# Patient Record
Sex: FEMALE | Race: WHITE | NOT HISPANIC OR LATINO | Employment: FULL TIME | URBAN - METROPOLITAN AREA
[De-identification: names, ages, dates, MRNs, and addresses within clinical notes are randomized per-mention and may not be internally consistent; named-entity substitution may affect disease eponyms.]

---

## 2019-02-21 ENCOUNTER — ANCILLARY PROCEDURE (OUTPATIENT)
Dept: GENERAL RADIOLOGY | Facility: CLINIC | Age: 24
End: 2019-02-21
Attending: INTERNAL MEDICINE
Payer: OTHER GOVERNMENT

## 2019-02-21 ENCOUNTER — OFFICE VISIT (OUTPATIENT)
Dept: URGENT CARE | Facility: URGENT CARE | Age: 24
End: 2019-02-21
Payer: OTHER GOVERNMENT

## 2019-02-21 VITALS
DIASTOLIC BLOOD PRESSURE: 80 MMHG | WEIGHT: 150 LBS | BODY MASS INDEX: 24.96 KG/M2 | SYSTOLIC BLOOD PRESSURE: 128 MMHG | TEMPERATURE: 98.6 F | RESPIRATION RATE: 12 BRPM | OXYGEN SATURATION: 99 % | HEART RATE: 104 BPM

## 2019-02-21 DIAGNOSIS — M54.6 ACUTE RIGHT-SIDED THORACIC BACK PAIN: Primary | ICD-10-CM

## 2019-02-21 DIAGNOSIS — R06.02 SHORTNESS OF BREATH: ICD-10-CM

## 2019-02-21 LAB
ALBUMIN UR-MCNC: NEGATIVE MG/DL
APPEARANCE UR: CLEAR
BILIRUB UR QL STRIP: NEGATIVE
COLOR UR AUTO: YELLOW
GLUCOSE UR STRIP-MCNC: NEGATIVE MG/DL
HGB UR QL STRIP: NEGATIVE
KETONES UR STRIP-MCNC: NEGATIVE MG/DL
LEUKOCYTE ESTERASE UR QL STRIP: NEGATIVE
NITRATE UR QL: NEGATIVE
PH UR STRIP: 5.5 PH (ref 5–7)
SOURCE: NORMAL
SP GR UR STRIP: >1.03 (ref 1–1.03)
UROBILINOGEN UR STRIP-ACNC: 0.2 EU/DL (ref 0.2–1)

## 2019-02-21 PROCEDURE — 99214 OFFICE O/P EST MOD 30 MIN: CPT | Performed by: INTERNAL MEDICINE

## 2019-02-21 PROCEDURE — 71046 X-RAY EXAM CHEST 2 VIEWS: CPT

## 2019-02-21 PROCEDURE — 81003 URINALYSIS AUTO W/O SCOPE: CPT | Performed by: INTERNAL MEDICINE

## 2019-02-21 NOTE — PROGRESS NOTES
SUBJECTIVE:   Omar Holt is a 23 year old female who presents to clinic today for the following health issues:      Patient is a 23-year-old previously healthy female who presents for evaluation of right-sided thoracic pain.  Notes starts in the back wraps around to the front around her lower rib cage area.  She has been studying for respiratory therapy boards and notes that this may have contributed.  She is on Ortho Tri-Cyclen but no history of DVT or PE in the family.  She notes that when she takes a deep breath and turns her back this will cause her breathing to feel slightly short.  She has some associated slight dizziness feeling sweaty at times when pain is worse she notes that she has to lay down at times due to this.  She has been taking ibuprofen 1 pill twice per day for help with pain.  Has been pushing fluids and urinating more frequently no dysuria no hematuria.  Slightly decreased appetite.  No fevers did have a cough and URI prior to start of symptoms this is since resolved.    Has been taking ibuprofen, not helping.     Has been taking fluids.     Has been urinating more frequently    Decreased appetite noted. Has been pushing fluids.     No fevers. Feeling sweaty- feeling warm at times.             Problem list and histories reviewed & adjusted, as indicated.  Additional history: as documented    There is no problem list on file for this patient.    No past surgical history on file.    Social History     Tobacco Use     Smoking status: Never Smoker     Smokeless tobacco: Never Used   Substance Use Topics     Alcohol use: No     No family history on file.        Reviewed and updated as needed this visit by clinical staff  Tobacco  Allergies  Meds       Reviewed and updated as needed this visit by Provider         ROS:  Constitutional, HEENT, cardiovascular, pulmonary, GI, , musculoskeletal, neuro, skin, endocrine and psych systems are negative, except as otherwise noted.    OBJECTIVE:      /80   Pulse 104   Temp 98.6  F (37  C) (Oral)   Resp 12   Wt 68 kg (150 lb)   SpO2 99%   BMI 24.96 kg/m    Body mass index is 24.96 kg/m .  GENERAL: healthy, alert and no distress  EYES: Eyes grossly normal to inspection, PERRL and conjunctivae and sclerae normal  HENT: ear canals and TM's normal, nose and mouth without ulcers or lesions  NECK: no adenopathy, no asymmetry, masses, or scars and thyroid normal to palpation  RESP: lungs clear to auscultation - no rales, rhonchi or wheezes  CV: regular rate and rhythm, normal S1 S2, no S3 or S4, no murmur, click or rub, no peripheral edema and peripheral pulses strong  ABDOMEN: soft, nontender, no hepatosplenomegaly, no masses and bowel sounds normal  MS: reproduction of patient's symptoms with palpation along the right paraspinal muscles in the lower thoracic area/upper lumbar area, no masses, no central spine tenderness  SKIN: no suspicious lesions or rashes  NEURO: Normal strength and tone, mentation intact and speech normal  PSYCH: mentation appears normal, affect normal/bright    Diagnostic Test Results:  Results for orders placed or performed in visit on 02/21/19   *UA reflex to Microscopic and Culture ([place] and [facility] (except [place] and [place])   Result Value Ref Range    Color Urine Yellow     Appearance Urine Clear     Glucose Urine Negative NEG^Negative mg/dL    Bilirubin Urine Negative NEG^Negative    Ketones Urine Negative NEG^Negative mg/dL    Specific Gravity Urine >1.030 1.003 - 1.035    Blood Urine Negative NEG^Negative    pH Urine 5.5 5.0 - 7.0 pH    Protein Albumin Urine Negative NEG^Negative mg/dL    Urobilinogen Urine 0.2 0.2 - 1.0 EU/dL    Nitrite Urine Negative NEG^Negative    Leukocyte Esterase Urine Negative NEG^Negative    Source Midstream Urine      Xray without signs of PTX   ASSESSMENT/PLAN:       ICD-10-CM    1. Acute right-sided thoracic back pain M54.6 *UA reflex to Microscopic and Culture ([place] and  [facility] (except [place] and [place])   2. Shortness of breath R06.02 XR Chest 2 Views     Do not think this is a pulmonary embolism given nature of patient's symptoms reproduced on palpation.  Discussed warning signs for PE and need for evaluation in the ER if worsening symptoms.  Discussed symptomatic treatments.  No signs of UTI or nephrolithiasis.  No signs of pneumothorax or pneumonia on x-ray today.  See patient instructions.    A total of 25 minutes was spent evaluating patient with greater than 50% of time in direct patient contact and counseling of patient of above problems.      Reynaldo Fletcher MD, MD [facility]

## 2019-02-21 NOTE — NURSING NOTE
"Chief Complaint   Patient presents with     Urgent Care     Pain     Sharp pain on right side under ribs and radiating into back area.  She says it hurts to cough/sneeze.  No injury that she knows of.  No fever.  She has felt warm at times though.       Initial /80   Pulse 104   Temp 98.6  F (37  C) (Oral)   Resp 12   Wt 68 kg (150 lb)   SpO2 99%   BMI 24.96 kg/m   Estimated body mass index is 24.96 kg/m  as calculated from the following:    Height as of 8/2/15: 1.651 m (5' 5\").    Weight as of this encounter: 68 kg (150 lb)..  BP completed using cuff size: rashad Fitzgerald R.N.    "

## 2019-02-22 NOTE — PATIENT INSTRUCTIONS
1) Ibuprofen 2-3 times per day as needed    2) Try heat to help with pain and relax area 10-15 minutes per time to help    3) Try stretching back area as we discussed.    If dizziness worsening, or if breathing worse, would go to ER for further evaluation. Likely related to spasm pain going on right now.    Reynaldo Fletcher MD            Patient Education     Back Spasm (No Trauma)    Spasm of the back muscles can occur after a sudden forceful twisting or bending force (such as in a car accident), after a simple awkward movement, or after lifting something heavy with poor body positioning. In any case, muscle spasm adds to the pain. Sleeping in an awkward position or on a poor quality mattress can also cause this. Some people respond to emotional stress by tensing the muscles of their back.  Pain that continues may need further evaluation or other types of treatment such as physical therapy.  You don't always need X-rays for the initial evaluation of back pain, unless you had a physical injury such as from a car accident or fall. If your pain continues and doesn't respond to medical treatment, X-rays and other tests may then be done.   Home care    As soon as possible, start sitting or walking again to avoid problems from prolonged bed rest (muscle weakness, worsening back stiffness and pain, blood clots in the legs).    When in bed, try to find a position of comfort. A firm mattress is best. Try lying flat on your back with pillows under your knees. You can also try lying on your side with your knees bent up toward your chest and a pillow between your knees.    Avoid prolonged sitting, long car rides, or travel. This puts more stress on the lower back than standing or walking.     During the first 24 to 72 hours after an injury or flare-up, apply an ice pack to the painful area for 20 minutes, then remove it for 20 minutes. Do this over a period of 60 to 90 minutes or several times a day. This will reduce swelling and  pain. Always wrap ice packs in a thin towel.    You can start with ice, then switch to heat. Heat (hot shower, hot bath, or heating pad) reduces pain, and works well for muscle spasms. Apply heat to the painful area for 20 minutes, then remove it for 20 minutes. Do this over a period of 60 to 90 minutes or several times a day. Do not sleep on a heating pad as it can burn or damage skin.    Alternate ice and heat therapies.    Be aware of safe lifting methods and do not lift anything over 15 pounds until all the pain is gone.  Gentle stretching will help your back heal faster. Do this simple routine 2 to 3 times a day until your back is feeling better.    Lie on your back with your knees bent and both feet on the ground    Slowly raise your left knee to your chest as you flatten your lower back against the floor. Hold for 20 to 30 seconds.    Relax and repeat the exercise with your right knee.    Do 2 to 3 of these exercises for each leg.    Repeat, hugging both knees to your chest at the same time.    Do not bounce, but use a gentle pull.  Medicines  Talk to your doctor before using medicine, especially if you have other medical problems or are taking other medicines.  You may use acetaminophen or ibuprofen to control pain, unless your healthcare provider prescribed another pain medicine. If you have a chronic condition such as diabetes, liver or kidney disease, stomach ulcer, or gastrointestinal bleeding, or are taking blood thinners, talk with your healthcare provider before taking any medicines.  Be careful if you are given prescription pain medicine, narcotics, or medicine for muscle spasm. They can cause drowsiness, affect your coordination, reflexes, or judgment. Do not drive or operate heavy machinery when taking these medicines. Take pain medicine only as prescribed by your healthcare provider.  Follow-up care  Follow up with your doctor, or as advised. Physical therapy or further tests may be needed.  If  X-rays were taken, they may be reviewed by a radiologist. You will be notified of any new findings that may affect your care.  Call 911  Call 911 if any of these occur:    Trouble breathing    Confusion    Drowsiness or trouble awakening    Fainting or loss of consciousness    Rapid or very slow heart rate    Loss of bowel or bladder control  When to seek medical advice  Call your healthcare provider right away if any of these occur:    Pain becomes worse or spreads to your legs    Weakness or numbness in one or both legs    Numbness in the groin or genital area    Fever of 100.4 F (38 C) or higher, or as directed by your healthcare provider    Burning or pain when passing urine  Date Last Reviewed: 6/1/2016 2000-2018 [employer]. [address] [place] [address], [place] [address]. All rights reserved. This information is not intended as a substitute for professional medical care. Always follow your healthcare professional's instructions.

## 2019-04-19 ENCOUNTER — HEALTH MAINTENANCE LETTER (OUTPATIENT)
Age: 24
End: 2019-04-19

## 2019-11-05 ENCOUNTER — HEALTH MAINTENANCE LETTER (OUTPATIENT)
Age: 24
End: 2019-11-05

## 2020-11-22 ENCOUNTER — HEALTH MAINTENANCE LETTER (OUTPATIENT)
Age: 25
End: 2020-11-22

## 2021-09-19 ENCOUNTER — HEALTH MAINTENANCE LETTER (OUTPATIENT)
Age: 26
End: 2021-09-19

## 2022-01-01 ENCOUNTER — TRANSFERRED RECORDS (OUTPATIENT)
Dept: HEALTH INFORMATION MANAGEMENT | Facility: CLINIC | Age: 27
End: 2022-01-01
Payer: OTHER GOVERNMENT

## 2022-01-01 LAB — PAP SMEAR - HIM PATIENT REPORTED: NEGATIVE

## 2022-01-04 ENCOUNTER — HOSPITAL ENCOUNTER (EMERGENCY)
Facility: CLINIC | Age: 27
Discharge: HOME | End: 2022-01-05
Attending: EMERGENCY MEDICINE | Admitting: EMERGENCY MEDICINE
Payer: COMMERCIAL

## 2022-01-04 ENCOUNTER — HOSPITAL ENCOUNTER (EMERGENCY)
Facility: CLINIC | Age: 27
Discharge: STILL ADMITTED OR ADMINISTRATIVE | End: 2022-01-04
Attending: EMERGENCY MEDICINE | Admitting: EMERGENCY MEDICINE
Payer: COMMERCIAL

## 2022-01-04 ENCOUNTER — APPOINTMENT (OUTPATIENT)
Dept: CT IMAGING | Facility: CLINIC | Age: 27
End: 2022-01-04
Attending: EMERGENCY MEDICINE
Payer: COMMERCIAL

## 2022-01-04 VITALS
HEART RATE: 103 BPM | HEIGHT: 65 IN | TEMPERATURE: 99 F | DIASTOLIC BLOOD PRESSURE: 84 MMHG | OXYGEN SATURATION: 99 % | BODY MASS INDEX: 24.99 KG/M2 | WEIGHT: 150 LBS | RESPIRATION RATE: 18 BRPM | SYSTOLIC BLOOD PRESSURE: 145 MMHG

## 2022-01-04 DIAGNOSIS — N83.201 HEMORRHAGIC CYST OF RIGHT OVARY: ICD-10-CM

## 2022-01-04 LAB
ALBUMIN SERPL-MCNC: 4.6 G/DL (ref 3.4–5)
ALBUMIN UR-MCNC: NEGATIVE MG/DL
ALP SERPL-CCNC: 69 U/L (ref 40–150)
ALT SERPL W P-5'-P-CCNC: 29 U/L (ref 0–50)
ANION GAP SERPL CALCULATED.3IONS-SCNC: 6 MMOL/L (ref 3–14)
APPEARANCE UR: CLEAR
AST SERPL W P-5'-P-CCNC: 17 U/L (ref 0–45)
BASOPHILS # BLD AUTO: 0.1 10E3/UL (ref 0–0.2)
BASOPHILS NFR BLD AUTO: 1 %
BILIRUB SERPL-MCNC: 0.2 MG/DL (ref 0.2–1.3)
BILIRUB UR QL STRIP: NEGATIVE
BUN SERPL-MCNC: 8 MG/DL (ref 7–30)
CALCIUM SERPL-MCNC: 9.8 MG/DL (ref 8.5–10.1)
CHLORIDE BLD-SCNC: 106 MMOL/L (ref 94–109)
CO2 SERPL-SCNC: 26 MMOL/L (ref 20–32)
COLOR UR AUTO: ABNORMAL
CREAT SERPL-MCNC: 0.67 MG/DL (ref 0.52–1.04)
EOSINOPHIL # BLD AUTO: 0 10E3/UL (ref 0–0.7)
EOSINOPHIL NFR BLD AUTO: 1 %
ERYTHROCYTE [DISTWIDTH] IN BLOOD BY AUTOMATED COUNT: 12.7 % (ref 10–15)
GFR SERPL CREATININE-BSD FRML MDRD: >90 ML/MIN/1.73M2
GLUCOSE BLD-MCNC: 83 MG/DL (ref 70–99)
GLUCOSE UR STRIP-MCNC: NEGATIVE MG/DL
HCG SERPL QL: NEGATIVE
HCT VFR BLD AUTO: 42.5 % (ref 35–47)
HGB BLD-MCNC: 14 G/DL (ref 11.7–15.7)
HGB UR QL STRIP: NEGATIVE
HOLD SPECIMEN: NORMAL
IMM GRANULOCYTES # BLD: 0 10E3/UL
IMM GRANULOCYTES NFR BLD: 0 %
KETONES UR STRIP-MCNC: ABNORMAL MG/DL
LEUKOCYTE ESTERASE UR QL STRIP: NEGATIVE
LIPASE SERPL-CCNC: 110 U/L (ref 73–393)
LYMPHOCYTES # BLD AUTO: 2.4 10E3/UL (ref 0.8–5.3)
LYMPHOCYTES NFR BLD AUTO: 27 %
MCH RBC QN AUTO: 30.1 PG (ref 26.5–33)
MCHC RBC AUTO-ENTMCNC: 32.9 G/DL (ref 31.5–36.5)
MCV RBC AUTO: 91 FL (ref 78–100)
MONOCYTES # BLD AUTO: 0.5 10E3/UL (ref 0–1.3)
MONOCYTES NFR BLD AUTO: 6 %
NEUTROPHILS # BLD AUTO: 5.7 10E3/UL (ref 1.6–8.3)
NEUTROPHILS NFR BLD AUTO: 65 %
NITRATE UR QL: NEGATIVE
NRBC # BLD AUTO: 0 10E3/UL
NRBC BLD AUTO-RTO: 0 /100
PH UR STRIP: 7 [PH] (ref 5–7)
PLATELET # BLD AUTO: 244 10E3/UL (ref 150–450)
POTASSIUM BLD-SCNC: 3.6 MMOL/L (ref 3.4–5.3)
PROT SERPL-MCNC: 8.7 G/DL (ref 6.8–8.8)
RBC # BLD AUTO: 4.65 10E6/UL (ref 3.8–5.2)
RBC URINE: 1 /HPF
SODIUM SERPL-SCNC: 138 MMOL/L (ref 133–144)
SP GR UR STRIP: 1.01 (ref 1–1.03)
UROBILINOGEN UR STRIP-MCNC: NORMAL MG/DL
WBC # BLD AUTO: 8.7 10E3/UL (ref 4–11)
WBC URINE: 0 /HPF

## 2022-01-04 PROCEDURE — 84703 CHORIONIC GONADOTROPIN ASSAY: CPT | Performed by: EMERGENCY MEDICINE

## 2022-01-04 PROCEDURE — 83690 ASSAY OF LIPASE: CPT | Performed by: EMERGENCY MEDICINE

## 2022-01-04 PROCEDURE — 81001 URINALYSIS AUTO W/SCOPE: CPT | Performed by: EMERGENCY MEDICINE

## 2022-01-04 PROCEDURE — 250N000009 HC RX 250: Performed by: EMERGENCY MEDICINE

## 2022-01-04 PROCEDURE — 85025 COMPLETE CBC W/AUTO DIFF WBC: CPT | Performed by: EMERGENCY MEDICINE

## 2022-01-04 PROCEDURE — 36415 COLL VENOUS BLD VENIPUNCTURE: CPT | Performed by: EMERGENCY MEDICINE

## 2022-01-04 PROCEDURE — 80053 COMPREHEN METABOLIC PANEL: CPT | Performed by: EMERGENCY MEDICINE

## 2022-01-04 PROCEDURE — 999N000104 HC STATISTIC NO CHARGE

## 2022-01-04 PROCEDURE — 74177 CT ABD & PELVIS W/CONTRAST: CPT

## 2022-01-04 PROCEDURE — 250N000011 HC RX IP 250 OP 636: Performed by: EMERGENCY MEDICINE

## 2022-01-04 PROCEDURE — 99285 EMERGENCY DEPT VISIT HI MDM: CPT | Mod: 25

## 2022-01-04 RX ORDER — PRENATAL VIT/IRON FUM/FOLIC AC 27MG-0.8MG
1 TABLET ORAL DAILY
COMMUNITY
End: 2022-01-07

## 2022-01-04 RX ORDER — IOPAMIDOL 755 MG/ML
75 INJECTION, SOLUTION INTRAVASCULAR ONCE
Status: DISCONTINUED | OUTPATIENT
Start: 2022-01-04 | End: 2022-01-04 | Stop reason: CLARIF

## 2022-01-04 RX ORDER — IOPAMIDOL 755 MG/ML
75 INJECTION, SOLUTION INTRAVASCULAR ONCE
Status: COMPLETED | OUTPATIENT
Start: 2022-01-04 | End: 2022-01-04

## 2022-01-04 RX ADMIN — IOPAMIDOL 75 ML: 755 INJECTION, SOLUTION INTRAVENOUS at 23:58

## 2022-01-04 RX ADMIN — SODIUM CHLORIDE 58 ML: 9 INJECTION, SOLUTION INTRAVENOUS at 23:58

## 2022-01-04 ASSESSMENT — MIFFLIN-ST. JEOR: SCORE: 1411.28

## 2022-01-05 ENCOUNTER — APPOINTMENT (OUTPATIENT)
Dept: ULTRASOUND IMAGING | Facility: CLINIC | Age: 27
End: 2022-01-05
Attending: EMERGENCY MEDICINE
Payer: COMMERCIAL

## 2022-01-05 VITALS
SYSTOLIC BLOOD PRESSURE: 114 MMHG | RESPIRATION RATE: 16 BRPM | DIASTOLIC BLOOD PRESSURE: 83 MMHG | OXYGEN SATURATION: 99 % | HEART RATE: 101 BPM | TEMPERATURE: 97.9 F

## 2022-01-05 PROCEDURE — 76830 TRANSVAGINAL US NON-OB: CPT

## 2022-01-05 PROCEDURE — 250N000013 HC RX MED GY IP 250 OP 250 PS 637: Performed by: EMERGENCY MEDICINE

## 2022-01-05 RX ORDER — IBUPROFEN 600 MG/1
600 TABLET, FILM COATED ORAL ONCE
Status: COMPLETED | OUTPATIENT
Start: 2022-01-05 | End: 2022-01-05

## 2022-01-05 RX ORDER — ACETAMINOPHEN 325 MG/1
650 TABLET ORAL ONCE
Status: COMPLETED | OUTPATIENT
Start: 2022-01-05 | End: 2022-01-05

## 2022-01-05 RX ADMIN — IBUPROFEN 600 MG: 600 TABLET ORAL at 01:32

## 2022-01-05 RX ADMIN — ACETAMINOPHEN 650 MG: 325 TABLET, FILM COATED ORAL at 01:32

## 2022-01-05 ASSESSMENT — ENCOUNTER SYMPTOMS
BACK PAIN: 0
ABDOMINAL PAIN: 1
DIARRHEA: 1
APPETITE CHANGE: 0
FREQUENCY: 1
VOMITING: 0

## 2022-01-05 NOTE — ED PROVIDER NOTES
History   Chief Complaint:  Abdominal pain      HPI   Omar Chatterjee is a 27 year old female who presents with abdominal pain. The patient reports that she has been experiencing increased urinary frequency over the last 1.5 weeks. She also developed acute right lower quadrant abdominal pain three nights ago, which has been presenting intermittently since onset. Pain was most severe between 6pm and 5am Saturday evening. The patient visited urgent care earlier this evening and was subsequently referred to the ED for an appendicitis work up. Here in the ED, the patient reports that she is feeling hot. Abdominal pain is currently rated at a 7/10. She also endorses recent diarrhea. Denies vomiting, new vaginal discharge, change in appetite, or lower back pain. Her last menstrual cycle occurred on the 15th of last month and was normal.       Review of Systems   Constitutional: Negative for appetite change.   Gastrointestinal: Positive for abdominal pain and diarrhea. Negative for vomiting.   Genitourinary: Positive for frequency. Negative for vaginal discharge.   Musculoskeletal: Negative for back pain.         Allergies:  Erythromycin    Medications:  No known current medications     Past Medical History:     No known past medical history     Past Surgical History:    No known surgical history     Family History:    No known family history     Social History:  Accompanied by a friend  Arrives via triage     Physical Exam     Patient Vitals for the past 24 hrs:   BP Temp Temp src Pulse Resp SpO2   01/05/22 0132 -- -- -- -- -- 99 %   01/05/22 0131 114/83 97.9  F (36.6  C) Oral 101 16 97 %       Physical Exam  General: Patient is alert and normal appearing.  HEENT: Head atraumatic    Eyes: pupils equal and reactive. Conjunctiva clear   Nares: patent   Oropharynx: no lesions, uvula midline, no palatal draping, normal voice, no trismus  Neck: Supple without lymphadenopathy, no meningismus  Chest: Heart regular rate and  rhythm.   Lungs: Equal clear to auscultation with no wheeze or rales  Abdomen: Soft, right lower quadrant tenderness to palpation with no rebound or guarding nondistended, normal bowel sounds  Back: No costovertebral angle tenderness, no midline C, T or L spine tenderness  Neuro: Grossly nonfocal, normal speech, strength equal bilaterally, CN 2-12 intact  Extremities: No deformities, equal radial and DP pulses. No clubbing, cyanosis.  No edema  Skin: Warm and dry with no rash.       Emergency Department Course     Imaging:    US Pelvis Cmplt w Transvag & Doppler LmtPel Duplex Limited  1.  2 cm heterogeneous lesion in the right ovary. This is nonspecific, but most likely represents a hemorrhagic cyst.  2.  Otherwise, unremarkable appearance of the uterus and ovaries. Blood flow is visualized in both ovaries.  3.  A small to moderate amount of free fluid in the pelvis. This is nonspecific, but could relate to a ruptured ovarian cyst.     CT Abdomen Pelvis w Contrast  1.  No appendicitis.    2.  Involuting follicle or cyst in the right ovary with a small amount of low-attenuation fluid within the pelvis.    Report per radiology    Laboratory:     Labs Ordered and Resulted from Time of ED Arrival to Time of ED Departure  ROUTINE UA WITH MICROSCOPIC REFLEX TO CULTURE - Abnormal      Result Value   Color Urine Straw     Appearance Urine Clear     Glucose Urine Negative     Bilirubin Urine Negative     Ketones Urine Trace (*)    Specific Gravity Urine 1.009     Blood Urine Negative     pH Urine 7.0     Protein Albumin Urine Negative     Urobilinogen Urine Normal     Nitrite Urine Negative     Leukocyte Esterase Urine Negative     RBC Urine 1     WBC Urine 0    COMPREHENSIVE METABOLIC PANEL - Normal   Sodium 138     Potassium 3.6     Chloride 106     Carbon Dioxide (CO2) 26     Anion Gap 6     Urea Nitrogen 8     Creatinine 0.67     Calcium 9.8     Glucose 83     Alkaline Phosphatase 69     AST 17     ALT 29     Protein  Total 8.7     Albumin 4.6     Bilirubin Total 0.2     GFR Estimate >90    LIPASE - Normal   Lipase 110    HCG QUALITATIVE PREGNANCY - Normal   hCG Serum Qualitative Negative    CBC WITH PLATELETS AND DIFFERENTIAL   WBC Count 8.7     RBC Count 4.65     Hemoglobin 14.0     Hematocrit 42.5     MCV 91     MCH 30.1     MCHC 32.9     RDW 12.7     Platelet Count 244     % Neutrophils 65     % Lymphocytes 27     % Monocytes 6     % Eosinophils 1     % Basophils 1     % Immature Granulocytes 0     NRBCs per 100 WBC 0     Absolute Neutrophils 5.7     Absolute Lymphocytes 2.4     Absolute Monocytes 0.5     Absolute Eosinophils 0.0     Absolute Basophils 0.1     Absolute Immature Granulocytes 0.0     Absolute NRBCs 0.0      Emergency Department Course:    Reviewed:  I reviewed nursing notes, vitals, past medical history and Care Everywhere    Assessments:  2352 I obtained history and examined the patient as noted above.   0012 I rechecked the patient and explained findings.   0123 I rechecked the patient.     Interventions:  0132 Tylenol 650 mg PO  0132 Ibuprofen 600 mg PO    Disposition:  The patient was discharged to home.     Impression & Plan     Medical Decision Making:  Omar Holt is a 26 year old female who presents with pelvic pain.  They look overall well.  A broad differential diagnosis was considered including colitis, appendicitis, intestinal cramping, pyelonephritis, UTI, kidney stone, constipation, diverticulitis, volvulus, ileus, obstruction, pregnancy (ectopic or intrauterine), ovarian cyst (enlarged or ruptured), ovarian torsion, PID, etc as possibilities.  Concern for appendicitis led to CT scan imaging.  This was negative for appendicitis or kidney stone.  Did reveal involuting follicular cyst of the right ovary.  Patient was therefore sent for ultrasound.   The workup in the ED shows ovarian cyst.  She understands this may or not be the source of pain.  Patient's monogamous with her  with no  history of STDs and no change in vaginal discharge therefore pelvic exam was not performed.  Doubt PID or TOA given clear findings of cyst without signs of abscess.  No other etiology for the patients pain is found at this point and my suspicion of an intraabdominal catastrophe or other worrisome etiology is very low.  I will not therefore admit for serial exams and further workup.  Patient is hemodynamically stable in ED. Plan is home with 4 week recheck by ultrasound of cyst to ensure resolution.  Return for fevers greater than 102, increasing pain, other new symptoms develop.  Abdominal pain handout given.  Questions were answered.       Diagnosis:    ICD-10-CM    1. Hemorrhagic cyst of right ovary  N83.201      Scribe Disclosure:  I, Saman Hedrick, am serving as a scribe at 11:58 PM on 1/4/2022 to document services personally performed by Stacey Payton MD based on my observations and the provider's statements to me.          Stacey Payton MD  01/05/22 0300

## 2022-01-05 NOTE — ED TRIAGE NOTES
[facility]  ED Arrival Note    Arrives through triage c/o lower abdominal pain. Pt states that pain started on Saturday and is constant but variable in severity. Denies any n/v Pt states that she noticed frequency in urination. Denies any other urinary symptoms. Pt states that [facility] and referred here for possible appendicitis.    Visitors during triage: Spouse    Triage Interventions: IV and labs    Ambulatory: Yes    Meets Stroke Criteria?: No    Meets Trauma Criteria?: No    Shock Index: N/A, for provider reference    Directed to: Main ED

## 2022-01-05 NOTE — ED TRIAGE NOTES
[facility]  ED Arrival Note    Arrives through triage c/o lower abdominal pain. Pt states that pain started on Saturday and is constant but variable in severity. Denies any n/v Pt states that she noticed frequency in urination. Denies any other urinary symptoms. Pt states that [facility] and referred here for possible appendicitis.    Visitors during triage: Spouse    Triage Interventions: IV and labs    Ambulatory: Yes    Meets Stroke Criteria?: No    Meets Trauma Criteria?: No    Shock Index: N/A, for provider reference    Directed to: Main ED

## 2022-01-09 ENCOUNTER — HEALTH MAINTENANCE LETTER (OUTPATIENT)
Age: 27
End: 2022-01-09

## 2022-02-01 ENCOUNTER — OFFICE VISIT (OUTPATIENT)
Dept: FAMILY MEDICINE | Facility: CLINIC | Age: 27
End: 2022-02-01
Payer: COMMERCIAL

## 2022-02-01 ENCOUNTER — TRANSFERRED RECORDS (OUTPATIENT)
Dept: HEALTH INFORMATION MANAGEMENT | Facility: CLINIC | Age: 27
End: 2022-02-01

## 2022-02-01 VITALS
OXYGEN SATURATION: 100 % | BODY MASS INDEX: 26.63 KG/M2 | RESPIRATION RATE: 20 BRPM | TEMPERATURE: 97.3 F | WEIGHT: 160 LBS | HEART RATE: 95 BPM | DIASTOLIC BLOOD PRESSURE: 84 MMHG | SYSTOLIC BLOOD PRESSURE: 125 MMHG

## 2022-02-01 DIAGNOSIS — E06.3 AUTOIMMUNE THYROIDITIS: Primary | ICD-10-CM

## 2022-02-01 PROCEDURE — 36415 COLL VENOUS BLD VENIPUNCTURE: CPT | Performed by: INTERNAL MEDICINE

## 2022-02-01 PROCEDURE — 99213 OFFICE O/P EST LOW 20 MIN: CPT | Performed by: INTERNAL MEDICINE

## 2022-02-01 PROCEDURE — 86376 MICROSOMAL ANTIBODY EACH: CPT | Performed by: INTERNAL MEDICINE

## 2022-02-01 PROCEDURE — 84443 ASSAY THYROID STIM HORMONE: CPT | Performed by: INTERNAL MEDICINE

## 2022-02-01 PROCEDURE — 84439 ASSAY OF FREE THYROXINE: CPT | Performed by: INTERNAL MEDICINE

## 2022-02-01 ASSESSMENT — PAIN SCALES - GENERAL: PAINLEVEL: NO PAIN (0)

## 2022-02-01 NOTE — PATIENT INSTRUCTIONS
(E06.3) Autoimmune thyroiditis  (primary encounter diagnosis)  Comment: We will recheck TSH and TPO antibody and treat accordingly,.  Also referral for thyroid ultrasound placed  Plan: TSH with free T4 reflex, Thyroid peroxidase         antibody, US Thyroid

## 2022-02-01 NOTE — PROGRESS NOTES
Subjective   Omar is a 26 year old who presents for the following health issues     HPI     Autoimmune thyroiditis   Omar Holt presents to the clinic for symptoms of posterior cervical lymphadenopaty.   She's also had symptoms of cool extremities, weight gain and fatigue.  Symptoms have been present for over one year.      Review of Systems   Constitutional, HEENT, cardiovascular, pulmonary, GI, , musculoskeletal, neuro, skin, endocrine and psych systems are negative, except as otherwise noted.      Objective    /84 (BP Location: Right arm, Patient Position: Sitting)   Pulse 95   Temp 97.3  F (36.3  C) (Temporal)   Resp 20   Wt 72.6 kg (160 lb)   LMP 01/15/2022   SpO2 100%   Breastfeeding No   BMI 26.63 kg/m    Body mass index is 26.63 kg/m .  Physical Exam   GENERAL: healthy, alert and no distress  EYES: Eyes grossly normal to inspection,    NECK: no adenopathy, no asymmetry, masses, or scars and thyroid normal to palpation  RESP: lungs clear to auscultation - no rales, rhonchi or wheezes  CV: regular rate and rhythm, normal S1 S2, no S3 or S4, no murmur,   ABDOMEN: soft, nontender, no hepatosplenomegaly, no masses and bowel sounds normal  MS: no gross musculoskeletal defects noted, no edema  SKIN: no suspicious lesions or rashes  NEURO: Normal strength and tone, mentation intact and speech normal  PSYCH: mentation appears normal, affect normal/bright    No results found for this or any previous visit (from the past 240 hour(s)).        Patient Instructions   (E06.3) Autoimmune thyroiditis  (primary encounter diagnosis)  Comment: We will recheck TSH and TPO antibody and treat accordingly,.  Also referral for thyroid ultrasound placed  Plan: TSH with free T4 reflex, Thyroid peroxidase         antibody, US Thyroid

## 2022-02-02 LAB
T4 FREE SERPL-MCNC: 0.89 NG/DL (ref 0.76–1.46)
THYROPEROXIDASE AB SERPL-ACNC: 3040 IU/ML
TSH SERPL DL<=0.005 MIU/L-ACNC: 9.94 MU/L (ref 0.4–4)

## 2022-02-04 ENCOUNTER — HOSPITAL ENCOUNTER (OUTPATIENT)
Dept: ULTRASOUND IMAGING | Facility: CLINIC | Age: 27
Discharge: HOME | End: 2022-02-04
Attending: INTERNAL MEDICINE | Admitting: INTERNAL MEDICINE
Payer: COMMERCIAL

## 2022-02-04 DIAGNOSIS — E06.3 AUTOIMMUNE THYROIDITIS: ICD-10-CM

## 2022-02-04 PROCEDURE — 76536 US EXAM OF HEAD AND NECK: CPT

## 2022-02-06 ENCOUNTER — MYC MEDICAL ADVICE (OUTPATIENT)
Dept: FAMILY MEDICINE | Facility: CLINIC | Age: 27
End: 2022-02-06
Payer: COMMERCIAL

## 2022-02-06 ENCOUNTER — TELEPHONE (OUTPATIENT)
Dept: FAMILY MEDICINE | Facility: CLINIC | Age: 27
End: 2022-02-06
Payer: COMMERCIAL

## 2022-02-06 DIAGNOSIS — E06.3 HASHIMOTO'S THYROIDITIS: Primary | ICD-10-CM

## 2022-02-06 DIAGNOSIS — E04.1 THYROID NODULE: Primary | ICD-10-CM

## 2022-02-06 RX ORDER — LEVOTHYROXINE SODIUM 75 UG/1
75 TABLET ORAL DAILY
Qty: 90 TABLET | Refills: 3 | Status: SHIPPED | OUTPATIENT
Start: 2022-02-06 | End: 2022-03-07

## 2022-02-06 NOTE — RESULT ENCOUNTER NOTE
Jose Nelson,    I have had the opportunity to review your recent results and an interpretation is as follows:  You definitively have a diagnosis of Hashimoto's Thyroiditis and I would recommend starting a low dose of levothyroxine and checking thyroid ultrasound as we discussed - [facility] phone #[phone]     Will need to recheck labs in 6 weeks     Sincerely,  Chema Cruz MD

## 2022-02-06 NOTE — TELEPHONE ENCOUNTER
Can we call Omar Yanet and let her know that     Jose Nelson,    I have had the opportunity to review your recent results and an interpretation is as follows:  You definitively have a diagnosis of Hashimoto's Thyroiditis and I would recommend starting a low dose of levothyroxine and checking thyroid ultrasound as we discussed - [facility] phone #[phone]     Will need to recheck labs in 6 weeks     Sincerely,  Chema Cruz MD

## 2022-02-06 NOTE — RESULT ENCOUNTER NOTE
Jose Nelson,    I have had the opportunity to review your recent results and an interpretation is as follows:  Your thyroid ultrasound did show a thyroid nodule and a follow up thyroid ultrasound was recommended in one year for 5 years total     Sincerely,  Chema Cruz MD

## 2022-02-07 NOTE — TELEPHONE ENCOUNTER
patient viewed imaging result result note via [employer]. Also sent [employer] message back on 2/6/22.  Writer messaged patient in [facility] encounter on 2/6/22 to start taking medication and follow up labs in 6 weeks.     Wu Hung RN  [facility]

## 2022-03-05 DIAGNOSIS — E06.3 HASHIMOTO'S THYROIDITIS: ICD-10-CM

## 2022-03-07 RX ORDER — LEVOTHYROXINE SODIUM 75 UG/1
TABLET ORAL
Qty: 90 TABLET | Refills: 3 | Status: SHIPPED | OUTPATIENT
Start: 2022-03-07 | End: 2022-03-25

## 2022-03-07 NOTE — TELEPHONE ENCOUNTER
Routing refill request to provider for review/approval because:  Labs out of range:    Lab Test 02/01/22  1651   TSH 9.94*     Last office vist: 2/1/22    Pended 90 day supply & 0 refills. Please Review.    Next office visit: none scheduled      Samantha Muller RN  [facility]

## 2022-03-11 ENCOUNTER — MYC MEDICAL ADVICE (OUTPATIENT)
Dept: FAMILY MEDICINE | Facility: CLINIC | Age: 27
End: 2022-03-11
Payer: COMMERCIAL

## 2022-03-14 ENCOUNTER — LAB (OUTPATIENT)
Dept: LAB | Facility: CLINIC | Age: 27
End: 2022-03-14
Payer: COMMERCIAL

## 2022-03-14 DIAGNOSIS — E06.3 HASHIMOTO'S THYROIDITIS: ICD-10-CM

## 2022-03-14 PROCEDURE — 36415 COLL VENOUS BLD VENIPUNCTURE: CPT

## 2022-03-14 PROCEDURE — 84443 ASSAY THYROID STIM HORMONE: CPT

## 2022-03-15 LAB — TSH SERPL DL<=0.005 MIU/L-ACNC: 3.06 MU/L (ref 0.4–4)

## 2022-03-16 ENCOUNTER — HOSPITAL ENCOUNTER (EMERGENCY)
Facility: CLINIC | Age: 27
Discharge: LEFT BEFORE DISCHARGE | End: 2022-03-16
Admitting: EMERGENCY MEDICINE
Payer: COMMERCIAL

## 2022-03-16 VITALS
HEART RATE: 96 BPM | BODY MASS INDEX: 27.99 KG/M2 | SYSTOLIC BLOOD PRESSURE: 151 MMHG | OXYGEN SATURATION: 100 % | RESPIRATION RATE: 16 BRPM | TEMPERATURE: 98.1 F | WEIGHT: 168 LBS | DIASTOLIC BLOOD PRESSURE: 91 MMHG | HEIGHT: 65 IN

## 2022-03-16 LAB
ATRIAL RATE - MUSE: 90 BPM
DIASTOLIC BLOOD PRESSURE - MUSE: NORMAL MMHG
INTERPRETATION ECG - MUSE: NORMAL
P AXIS - MUSE: 22 DEGREES
PR INTERVAL - MUSE: 146 MS
QRS DURATION - MUSE: 84 MS
QT - MUSE: 350 MS
QTC - MUSE: 428 MS
R AXIS - MUSE: 40 DEGREES
SYSTOLIC BLOOD PRESSURE - MUSE: NORMAL MMHG
T AXIS - MUSE: 45 DEGREES
VENTRICULAR RATE- MUSE: 90 BPM

## 2022-03-16 PROCEDURE — 999N000104 HC STATISTIC NO CHARGE

## 2022-03-16 PROCEDURE — 93005 ELECTROCARDIOGRAM TRACING: CPT

## 2022-03-16 NOTE — ED TRIAGE NOTES
Mid sternal chest pain on and off for 1.5 weeks, started thyroid medications last week, feeling cold all the time, and shaky. Could not get into the clinic until next Friday.

## 2022-03-17 ENCOUNTER — OFFICE VISIT (OUTPATIENT)
Dept: FAMILY MEDICINE | Facility: CLINIC | Age: 27
End: 2022-03-17
Payer: COMMERCIAL

## 2022-03-17 VITALS
BODY MASS INDEX: 27.96 KG/M2 | SYSTOLIC BLOOD PRESSURE: 126 MMHG | TEMPERATURE: 97.5 F | HEART RATE: 95 BPM | OXYGEN SATURATION: 100 % | HEIGHT: 65 IN | DIASTOLIC BLOOD PRESSURE: 88 MMHG

## 2022-03-17 DIAGNOSIS — E06.3 HASHIMOTO'S THYROIDITIS: ICD-10-CM

## 2022-03-17 DIAGNOSIS — E06.3 HASHIMOTO'S THYROIDITIS: Primary | ICD-10-CM

## 2022-03-17 DIAGNOSIS — R53.83 FATIGUE, UNSPECIFIED TYPE: ICD-10-CM

## 2022-03-17 DIAGNOSIS — R68.83 CHILLS: ICD-10-CM

## 2022-03-17 PROCEDURE — 99214 OFFICE O/P EST MOD 30 MIN: CPT | Performed by: PHYSICIAN ASSISTANT

## 2022-03-17 RX ORDER — LEVOTHYROXINE SODIUM 50 UG/1
50 TABLET ORAL DAILY
Qty: 90 TABLET | Refills: 0 | Status: CANCELLED | OUTPATIENT
Start: 2022-03-17

## 2022-03-17 RX ORDER — THYROID 60 MG/1
60 TABLET ORAL
Qty: 60 TABLET | Refills: 0 | Status: CANCELLED | OUTPATIENT
Start: 2022-03-17

## 2022-03-17 RX ORDER — LEVOTHYROXINE SODIUM 25 UG/1
25 TABLET ORAL DAILY
Qty: 30 TABLET | Refills: 1 | Status: SHIPPED | OUTPATIENT
Start: 2022-03-17 | End: 2022-04-21

## 2022-03-17 RX ORDER — LEVOTHYROXINE SODIUM 75 MCG
75 TABLET ORAL DAILY
Qty: 90 TABLET | Refills: 0 | Status: CANCELLED | OUTPATIENT
Start: 2022-03-17

## 2022-03-17 NOTE — PROGRESS NOTES
"  Assessment & Plan     Hashimoto's thyroiditis  Pt started on 75 mcg by Dr. Cruz.  About a week after starting, pt started having chills, sweats, chest pain, trouble focusing on objects.  Discussed that the higher dose of medication was probably a shock to her system and as her TSH was only 9.9, I would recommend starting her on a much lower dose.  We will try this dose for a month and then recheck labs.  Will see how she's feel at that time.  If TSH is still high, will increase to 50 mg for a month.  If not helping on the symptoms, may consider brand name synthroid or armour thyroid.  - TSH with free T4 reflex; Future  - levothyroxine (SYNTHROID/LEVOTHROID) 25 MCG tablet; Take 1 tablet (25 mcg) by mouth daily    Chills      Fatigue, unspecified type       BMI:   Estimated body mass index is 27.96 kg/m  as calculated from the following:    Height as of this encounter: 1.651 m (5' 5\").    Weight as of 3/16/22: 76.2 kg (168 lb).           Return in about 2 months (around 5/17/2022) for Recheck if not improving, Non-Fasting Lab Only Visit.    Katya Garrido PA-C  [facility]    Torie Nelson is a 26 year old who presents for the following health issues    Pt states that starting new medication she has had chest pain, extreme/being cold, L sided neck pain, high blood pressure and dizziness and blurred vision since starting taking medication 1 month.  History of Present Illness       Hypothyroidism:     Since last visit, patient describes the following symptoms::  Fatigue    She eats 2-3 servings of fruits and vegetables daily.She consumes 1 sweetened beverage(s) daily.She exercises with enough effort to increase her heart rate 30 to 60 minutes per day.  She exercises with enough effort to increase her heart rate 5 days per week.   She is taking medications regularly.     Additional complaints: None  HPI additional notes: Omar presents today with   Chief Complaint " "  Patient presents with     Medication Follow-up     side effects   Has chest pain everyday throughout the day.  Will sweat all the time and shake because she was cold.  Had trouble looking on focusing on objects.      TSH   Date Value Ref Range Status   03/14/2022 3.06 0.40 - 4.00 mU/L Final   02/01/2022 9.94 (H) 0.40 - 4.00 mU/L Final     Wt Readings from Last 5 Encounters:   02/01/22 72.6 kg (160 lb)   01/04/22 68 kg (150 lb)   02/21/19 68 kg (150 lb)   08/02/15 63.5 kg (140 lb)   06/22/14 69.4 kg (153 lb)          Review of Systems   Constitutional, HEENT, cardiovascular, pulmonary, gi and gu systems are negative, except as otherwise noted.      Objective    /88   Pulse 95   Temp 97.5  F (36.4  C) (Oral)   Ht 1.651 m (5' 5\")   LMP 02/20/2022   SpO2 100%   BMI 27.96 kg/m    Body mass index is 27.96 kg/m .  Physical Exam     Physical Exam   GENERAL: healthy, alert, in no acute distress  SKIN: no suspicious lesions, no rashes  PSYCH: Alert and oriented times 3;  Able to articulate logical thoughts. Affect is normal.     "

## 2022-03-18 RX ORDER — LEVOTHYROXINE SODIUM 25 UG/1
TABLET ORAL
Qty: 90 TABLET | OUTPATIENT
Start: 2022-03-18

## 2022-03-20 NOTE — RESULT ENCOUNTER NOTE
Jose Nelson,    I have had the opportunity to review your recent results and an interpretation is as follows:  Your follow-up TSH was much improved and back to within normal limits with your current dose of levothyroxine     Sincerely,  Chema Cruz MD

## 2022-03-25 ENCOUNTER — OFFICE VISIT (OUTPATIENT)
Dept: FAMILY MEDICINE | Facility: CLINIC | Age: 27
End: 2022-03-25
Payer: COMMERCIAL

## 2022-03-25 VITALS
SYSTOLIC BLOOD PRESSURE: 126 MMHG | RESPIRATION RATE: 16 BRPM | HEIGHT: 65 IN | HEART RATE: 81 BPM | BODY MASS INDEX: 27.96 KG/M2 | OXYGEN SATURATION: 100 % | DIASTOLIC BLOOD PRESSURE: 86 MMHG | TEMPERATURE: 97.7 F

## 2022-03-25 DIAGNOSIS — M54.2 NECK PAIN: ICD-10-CM

## 2022-03-25 DIAGNOSIS — Z01.810 PRE-OPERATIVE CARDIOVASCULAR EXAMINATION: Primary | ICD-10-CM

## 2022-03-25 DIAGNOSIS — M54.12 CERVICAL RADICULOPATHY: ICD-10-CM

## 2022-03-25 LAB
ANION GAP SERPL CALCULATED.3IONS-SCNC: 8 MMOL/L (ref 3–14)
BASOPHILS # BLD AUTO: 0 10E3/UL (ref 0–0.2)
BASOPHILS NFR BLD AUTO: 1 %
BUN SERPL-MCNC: 13 MG/DL (ref 7–30)
CALCIUM SERPL-MCNC: 9.5 MG/DL (ref 8.5–10.1)
CHLORIDE BLD-SCNC: 106 MMOL/L (ref 94–109)
CO2 SERPL-SCNC: 24 MMOL/L (ref 20–32)
CREAT SERPL-MCNC: 0.7 MG/DL (ref 0.52–1.04)
EOSINOPHIL # BLD AUTO: 0.1 10E3/UL (ref 0–0.7)
EOSINOPHIL NFR BLD AUTO: 1 %
ERYTHROCYTE [DISTWIDTH] IN BLOOD BY AUTOMATED COUNT: 13.3 % (ref 10–15)
GFR SERPL CREATININE-BSD FRML MDRD: >90 ML/MIN/1.73M2
GLUCOSE BLD-MCNC: 93 MG/DL (ref 70–99)
HCG UR QL: NEGATIVE
HCT VFR BLD AUTO: 43 % (ref 35–47)
HGB BLD-MCNC: 14.4 G/DL (ref 11.7–15.7)
LYMPHOCYTES # BLD AUTO: 1.6 10E3/UL (ref 0.8–5.3)
LYMPHOCYTES NFR BLD AUTO: 33 %
MCH RBC QN AUTO: 31.6 PG (ref 26.5–33)
MCHC RBC AUTO-ENTMCNC: 33.5 G/DL (ref 31.5–36.5)
MCV RBC AUTO: 94 FL (ref 78–100)
MONOCYTES # BLD AUTO: 0.4 10E3/UL (ref 0–1.3)
MONOCYTES NFR BLD AUTO: 7 %
NEUTROPHILS # BLD AUTO: 2.9 10E3/UL (ref 1.6–8.3)
NEUTROPHILS NFR BLD AUTO: 58 %
PLATELET # BLD AUTO: 223 10E3/UL (ref 150–450)
POTASSIUM BLD-SCNC: 4 MMOL/L (ref 3.4–5.3)
RBC # BLD AUTO: 4.56 10E6/UL (ref 3.8–5.2)
SODIUM SERPL-SCNC: 138 MMOL/L (ref 133–144)
WBC # BLD AUTO: 5 10E3/UL (ref 4–11)

## 2022-03-25 PROCEDURE — 36415 COLL VENOUS BLD VENIPUNCTURE: CPT | Performed by: INTERNAL MEDICINE

## 2022-03-25 PROCEDURE — 81025 URINE PREGNANCY TEST: CPT | Performed by: INTERNAL MEDICINE

## 2022-03-25 PROCEDURE — 80048 BASIC METABOLIC PNL TOTAL CA: CPT | Performed by: INTERNAL MEDICINE

## 2022-03-25 PROCEDURE — 85025 COMPLETE CBC W/AUTO DIFF WBC: CPT | Performed by: INTERNAL MEDICINE

## 2022-03-25 PROCEDURE — 99214 OFFICE O/P EST MOD 30 MIN: CPT | Performed by: INTERNAL MEDICINE

## 2022-03-25 ASSESSMENT — PAIN SCALES - GENERAL: PAINLEVEL: NO PAIN (0)

## 2022-03-25 NOTE — PROGRESS NOTES
[address], [address]  [facility] [address]  Phone: [phone]  Primary Provider: Ayde, [facility]  Pre-op Performing Provider: FANI VOGEL      PREOPERATIVE EVALUATION:  Today's date: 3/25/2022    Omar Chatterjee is a 26 year old female who presents for a preoperative evaluation.    Surgical Information:Surgery/Procedure: DISC ARTHROPLASTY C5-C6  Surgery Location: [facility]  Surgeon: Kimberly Doshi  Surgery Date: 04/07/2022  Time of Surgery: TBD  Where patient plans to recover: At home with family   Fax number for surgical facility: [phone]      Type of Anesthesia Anticipated: to be determined    Assessment & Plan     The proposed surgical procedure is considered INTERMEDIATE risk.    Pre-operative cardiovascular examination  Neck pain  Cervical radiculopathy    - CBC with platelets and differential; Future  - Basic metabolic panel  (Ca, Cl, CO2, Creat, Gluc, K, Na, BUN); Future  - HCG Qual, Urine ([id number]); Future  - CBC with platelets and differential  - Basic metabolic panel  (Ca, Cl, CO2, Creat, Gluc, K, Na, BUN)  - HCG Qual, Urine ([id number])      Risks and Recommendations:  The patient has the following additional risks and recommendations for perioperative complications:   - No identified additional risk factors other than previously addressed    Medication Instructions:  Patient is to take all scheduled medications on the day of surgery    RECOMMENDATION:  APPROVAL GIVEN to proceed with proposed procedure, without further diagnostic evaluation.      Subjective     HPI related to upcoming procedure: patient Omar Chatterjee is a 26 year old female with chronic neck pains, cervical radiculopathy who presents to internal medicine clinic for a pre op cardiac evaluation for upcoming DISC ARTHROPLASTY C5-C6 spine surgery. No chest pain, headaches, fever or chills. No known allergies to anesthesia agents.     Preop Questions 3/23/2022   1.  Have you ever had a heart attack or stroke? No   2. Have you ever had surgery on your heart or blood vessels, such as a stent placement, a coronary artery bypass, or surgery on an artery in your head, neck, heart, or legs? No   3. Do you have chest pain with activity? No   4. Do you have a history of  heart failure? No   5. Do you currently have a cold, bronchitis or symptoms of other infection? No   6. Do you have a cough, shortness of breath, or wheezing? No   7. Do you or anyone in your family have previous history of blood clots? No   8. Do you or does anyone in your family have a serious bleeding problem such as prolonged bleeding following surgeries or cuts? No   9. Have you ever had problems with anemia or been told to take iron pills? No   10. Have you had any abnormal blood loss such as black, tarry or bloody stools, or abnormal vaginal bleeding? No   11. Have you ever had a blood transfusion? No   12. Are you willing to have a blood transfusion if it is medically needed before, during, or after your surgery? Yes   13. Have you or any of your relatives ever had problems with anesthesia? No   14. Do you have sleep apnea, excessive snoring or daytime drowsiness? No   15. Do you have any artifical heart valves or other implanted medical devices like a pacemaker, defibrillator, or continuous glucose monitor? No   16. Do you have artificial joints? No   17. Are you allergic to latex? No   18. Is there any chance that you may be pregnant? No       Health Care Directive:  Patient does not have a Health Care Directive or Living Will: Advance Directive received and scanned. Click on Code in the patient header to view.      Review of Systems  Constitutional, neuro, ENT, endocrine, pulmonary, cardiac, gastrointestinal, genitourinary, musculoskeletal, integument and psychiatric systems are negative, except as otherwise noted.    Patient Active Problem List    Diagnosis Date Noted     Hashimoto's thyroiditis 02/06/2022  "    Priority: Medium     Thyroid nodule 02/06/2022     Priority: Medium      Past Medical History:   Diagnosis Date     Hashimoto's thyroiditis      NO ACTIVE PROBLEMS      Thyroid nodule      No past surgical history on file.  Current Outpatient Medications   Medication Sig Dispense Refill     levothyroxine (SYNTHROID/LEVOTHROID) 25 MCG tablet Take 1 tablet (25 mcg) by mouth daily 30 tablet 1     levothyroxine (SYNTHROID/LEVOTHROID) 75 MCG tablet TAKE 1 TABLET BY MOUTH EVERY DAY 90 tablet 3     Prenatal MV & Min w/FA-DHA (PRENATAL ADULT GUMMY/DHA/FA PO)          Allergies   Allergen Reactions     Erythromycin      Stop Breathing, per pt.        Social History     Tobacco Use     Smoking status: Never Smoker     Smokeless tobacco: Never Used   Substance Use Topics     Alcohol use: No     Family History   Problem Relation Age of Onset     Hypertension Mother      History   Drug Use No         Objective     /86 (BP Location: Left arm, Patient Position: Sitting, Cuff Size: Adult Regular)   Pulse 81   Temp 97.7  F (36.5  C) (Temporal)   Resp 16   Ht 1.651 m (5' 5\")   SpO2 100%   BMI 27.96 kg/m      Physical Exam    GENERAL APPEARANCE: alert and no distress     EYES: EOMI,  PERRL     HENT: ear canals and TM's normal and nose and mouth without ulcers or lesions     NECK: no adenopathy, no asymmetry, masses, or scars and thyroid normal to palpation     RESP: lungs clear to auscultation - no rales, rhonchi or wheezes     CV: regular rates and rhythm, normal S1 S2, no S3 or S4 and no murmur, click or rub     ABDOMEN:  soft, nontender, no HSM or masses and bowel sounds normal     MS: c-spine radiculopathy symptoms noted.     SKIN: no suspicious lesions or rashes     NEURO: Normal strength and tone, sensory exam grossly normal, mentation intact and speech normal     PSYCH: mentation appears normal. and affect normal/bright     LYMPHATICS: No cervical adenopathy    Recent Labs   Lab Test 01/04/22  1936   HGB 14.0 "         POTASSIUM 3.6   CR 0.67        Diagnostics:  Recent Results (from the past 24 hour(s))   HCG Qual, Urine ([id number])    Collection Time: 03/25/22  7:33 AM   Result Value Ref Range    hCG Urine Qualitative Negative Negative   CBC with platelets and differential    Collection Time: 03/25/22  7:33 AM   Result Value Ref Range    WBC Count 5.0 4.0 - 11.0 10e3/uL    RBC Count 4.56 3.80 - 5.20 10e6/uL    Hemoglobin 14.4 11.7 - 15.7 g/dL    Hematocrit 43.0 35.0 - 47.0 %    MCV 94 78 - 100 fL    MCH 31.6 26.5 - 33.0 pg    MCHC 33.5 31.5 - 36.5 g/dL    RDW 13.3 10.0 - 15.0 %    Platelet Count 223 150 - 450 10e3/uL    % Neutrophils 58 %    % Lymphocytes 33 %    % Monocytes 7 %    % Eosinophils 1 %    % Basophils 1 %    Absolute Neutrophils 2.9 1.6 - 8.3 10e3/uL    Absolute Lymphocytes 1.6 0.8 - 5.3 10e3/uL    Absolute Monocytes 0.4 0.0 - 1.3 10e3/uL    Absolute Eosinophils 0.1 0.0 - 0.7 10e3/uL    Absolute Basophils 0.0 0.0 - 0.2 10e3/uL      No EKG required, no history of coronary heart disease, significant arrhythmia, peripheral arterial disease or other structural heart disease.    Revised Cardiac Risk Index (RCRI):  The patient has the following serious cardiovascular risks for perioperative complications:   - No serious cardiac risks = 0 points     RCRI Interpretation: 0 points: Class I (very low risk - 0.4% complication rate)           Signed Electronically by: Cat Dumont MD  Copy of this evaluation report is provided to requesting physician.

## 2022-03-25 NOTE — LETTER
March 25, 2022      Omar Chatterjee  [address] [address]        Dear ,    We are writing to inform you of your test results.    Enclosed are your lab results.    Resulted Orders   Basic metabolic panel  (Ca, Cl, CO2, Creat, Gluc, K, Na, BUN)   Result Value Ref Range    Sodium 138 133 - 144 mmol/L    Potassium 4.0 3.4 - 5.3 mmol/L    Chloride 106 94 - 109 mmol/L    Carbon Dioxide (CO2) 24 20 - 32 mmol/L    Anion Gap 8 3 - 14 mmol/L    Urea Nitrogen 13 7 - 30 mg/dL    Creatinine 0.70 0.52 - 1.04 mg/dL    Calcium 9.5 8.5 - 10.1 mg/dL    Glucose 93 70 - 99 mg/dL    GFR Estimate >90 >60 mL/min/1.73m2      Comment:      Effective December 21, 2021 eGFRcr in adults is calculated using the 2021 CKD-EPI creatinine equation which includes age and gender (Julieth et al., [place], DOI: 10.1056/PUWQzb8710342)   HCG Qual, Urine ([id number])   Result Value Ref Range    hCG Urine Qualitative Negative Negative      Comment:      This test is for screening purposes.  Results should be interpreted along with the clinical picture.  Confirmation testing is available if warranted by ordering [id number], HCG Quantitative Pregnancy.   CBC with platelets and differential   Result Value Ref Range    WBC Count 5.0 4.0 - 11.0 10e3/uL    RBC Count 4.56 3.80 - 5.20 10e6/uL    Hemoglobin 14.4 11.7 - 15.7 g/dL    Hematocrit 43.0 35.0 - 47.0 %    MCV 94 78 - 100 fL    MCH 31.6 26.5 - 33.0 pg    MCHC 33.5 31.5 - 36.5 g/dL    RDW 13.3 10.0 - 15.0 %    Platelet Count 223 150 - 450 10e3/uL    % Neutrophils 58 %    % Lymphocytes 33 %    % Monocytes 7 %    % Eosinophils 1 %    % Basophils 1 %    Absolute Neutrophils 2.9 1.6 - 8.3 10e3/uL    Absolute Lymphocytes 1.6 0.8 - 5.3 10e3/uL    Absolute Monocytes 0.4 0.0 - 1.3 10e3/uL    Absolute Eosinophils 0.1 0.0 - 0.7 10e3/uL    Absolute Basophils 0.0 0.0 - 0.2 10e3/uL       If you have any questions or concerns, please call the clinic at the number listed above.       Sincerely,      Cat  Phoenix Dumont MD

## 2022-04-20 ENCOUNTER — LAB (OUTPATIENT)
Dept: LAB | Facility: CLINIC | Age: 27
End: 2022-04-20
Payer: COMMERCIAL

## 2022-04-20 DIAGNOSIS — E06.3 HASHIMOTO'S THYROIDITIS: ICD-10-CM

## 2022-04-20 PROCEDURE — 84443 ASSAY THYROID STIM HORMONE: CPT

## 2022-04-20 PROCEDURE — 36415 COLL VENOUS BLD VENIPUNCTURE: CPT

## 2022-04-21 LAB — TSH SERPL DL<=0.005 MIU/L-ACNC: 2.29 MU/L (ref 0.4–4)

## 2022-04-21 RX ORDER — LEVOTHYROXINE SODIUM 25 UG/1
25 TABLET ORAL DAILY
Qty: 90 TABLET | Refills: 3 | Status: SHIPPED | OUTPATIENT
Start: 2022-04-21 | End: 2022-05-23

## 2022-04-21 NOTE — RESULT ENCOUNTER NOTE
Jose Nelson,    I just wanted to let you know that your lab results have been reviewed and are attached.    Your thyroid is working normally at your adjusted medication dose.  I have sent additional refills to your pharmacy.    Please let me know if you have any questions and have a great week!    Sincerely,    Samantha Garrido PA-C    [facility]  [address] [place] [address]  Clinic Phone: [phone]

## 2022-05-19 ENCOUNTER — APPOINTMENT (OUTPATIENT)
Dept: ULTRASOUND IMAGING | Facility: CLINIC | Age: 27
End: 2022-05-19
Attending: EMERGENCY MEDICINE
Payer: COMMERCIAL

## 2022-05-19 ENCOUNTER — HOSPITAL ENCOUNTER (EMERGENCY)
Facility: CLINIC | Age: 27
Discharge: HOME | End: 2022-05-19
Attending: EMERGENCY MEDICINE | Admitting: EMERGENCY MEDICINE
Payer: COMMERCIAL

## 2022-05-19 VITALS
HEIGHT: 66 IN | BODY MASS INDEX: 27 KG/M2 | TEMPERATURE: 98.2 F | SYSTOLIC BLOOD PRESSURE: 113 MMHG | DIASTOLIC BLOOD PRESSURE: 88 MMHG | RESPIRATION RATE: 20 BRPM | WEIGHT: 168 LBS | OXYGEN SATURATION: 97 % | HEART RATE: 99 BPM

## 2022-05-19 DIAGNOSIS — O03.9 MISCARRIAGE: ICD-10-CM

## 2022-05-19 DIAGNOSIS — N93.9 VAGINAL BLEEDING: ICD-10-CM

## 2022-05-19 LAB
ABO/RH(D): NORMAL
ALBUMIN UR-MCNC: NEGATIVE MG/DL
ANION GAP SERPL CALCULATED.3IONS-SCNC: 7 MMOL/L (ref 3–14)
ANTIBODY SCREEN: NEGATIVE
APPEARANCE UR: CLEAR
B-HCG SERPL-ACNC: 1 IU/L (ref 0–5)
BASOPHILS # BLD AUTO: 0 10E3/UL (ref 0–0.2)
BASOPHILS NFR BLD AUTO: 1 %
BILIRUB UR QL STRIP: NEGATIVE
BUN SERPL-MCNC: 8 MG/DL (ref 7–30)
CALCIUM SERPL-MCNC: 9.1 MG/DL (ref 8.5–10.1)
CHLORIDE BLD-SCNC: 107 MMOL/L (ref 94–109)
CO2 SERPL-SCNC: 24 MMOL/L (ref 20–32)
COLOR UR AUTO: ABNORMAL
CREAT SERPL-MCNC: 0.67 MG/DL (ref 0.52–1.04)
EOSINOPHIL # BLD AUTO: 0 10E3/UL (ref 0–0.7)
EOSINOPHIL NFR BLD AUTO: 1 %
ERYTHROCYTE [DISTWIDTH] IN BLOOD BY AUTOMATED COUNT: 12.5 % (ref 10–15)
GFR SERPL CREATININE-BSD FRML MDRD: >90 ML/MIN/1.73M2
GLUCOSE BLD-MCNC: 86 MG/DL (ref 70–99)
GLUCOSE UR STRIP-MCNC: NEGATIVE MG/DL
HCT VFR BLD AUTO: 42.8 % (ref 35–47)
HGB BLD-MCNC: 14.2 G/DL (ref 11.7–15.7)
HGB UR QL STRIP: ABNORMAL
IMM GRANULOCYTES # BLD: 0 10E3/UL
IMM GRANULOCYTES NFR BLD: 0 %
KETONES UR STRIP-MCNC: ABNORMAL MG/DL
LEUKOCYTE ESTERASE UR QL STRIP: NEGATIVE
LYMPHOCYTES # BLD AUTO: 1.5 10E3/UL (ref 0.8–5.3)
LYMPHOCYTES NFR BLD AUTO: 18 %
MCH RBC QN AUTO: 30 PG (ref 26.5–33)
MCHC RBC AUTO-ENTMCNC: 33.2 G/DL (ref 31.5–36.5)
MCV RBC AUTO: 91 FL (ref 78–100)
MONOCYTES # BLD AUTO: 0.5 10E3/UL (ref 0–1.3)
MONOCYTES NFR BLD AUTO: 6 %
NEUTROPHILS # BLD AUTO: 6.1 10E3/UL (ref 1.6–8.3)
NEUTROPHILS NFR BLD AUTO: 74 %
NITRATE UR QL: NEGATIVE
NRBC # BLD AUTO: 0 10E3/UL
NRBC BLD AUTO-RTO: 0 /100
PH UR STRIP: 7 [PH] (ref 5–7)
PLATELET # BLD AUTO: 241 10E3/UL (ref 150–450)
POTASSIUM BLD-SCNC: 3.7 MMOL/L (ref 3.4–5.3)
RBC # BLD AUTO: 4.73 10E6/UL (ref 3.8–5.2)
RBC URINE: 1 /HPF
SODIUM SERPL-SCNC: 138 MMOL/L (ref 133–144)
SP GR UR STRIP: 1 (ref 1–1.03)
SPECIMEN EXPIRATION DATE: NORMAL
SQUAMOUS EPITHELIAL: 2 /HPF
UROBILINOGEN UR STRIP-MCNC: NORMAL MG/DL
WBC # BLD AUTO: 8.1 10E3/UL (ref 4–11)
WBC URINE: <1 /HPF

## 2022-05-19 PROCEDURE — 76801 OB US < 14 WKS SINGLE FETUS: CPT

## 2022-05-19 PROCEDURE — 86901 BLOOD TYPING SEROLOGIC RH(D): CPT | Performed by: EMERGENCY MEDICINE

## 2022-05-19 PROCEDURE — 81001 URINALYSIS AUTO W/SCOPE: CPT | Performed by: EMERGENCY MEDICINE

## 2022-05-19 PROCEDURE — 99284 EMERGENCY DEPT VISIT MOD MDM: CPT | Mod: 25

## 2022-05-19 PROCEDURE — 80048 BASIC METABOLIC PNL TOTAL CA: CPT | Performed by: EMERGENCY MEDICINE

## 2022-05-19 PROCEDURE — 85004 AUTOMATED DIFF WBC COUNT: CPT | Performed by: EMERGENCY MEDICINE

## 2022-05-19 PROCEDURE — 36415 COLL VENOUS BLD VENIPUNCTURE: CPT | Performed by: EMERGENCY MEDICINE

## 2022-05-19 PROCEDURE — 84702 CHORIONIC GONADOTROPIN TEST: CPT | Performed by: EMERGENCY MEDICINE

## 2022-05-19 RX ORDER — SODIUM CHLORIDE 9 MG/ML
INJECTION, SOLUTION INTRAVENOUS CONTINUOUS
Status: DISCONTINUED | OUTPATIENT
Start: 2022-05-19 | End: 2022-05-19 | Stop reason: HOSPADM

## 2022-05-19 ASSESSMENT — ENCOUNTER SYMPTOMS
VOMITING: 0
NAUSEA: 1
FEVER: 0
DIARRHEA: 1
ABDOMINAL PAIN: 1

## 2022-05-19 NOTE — ED TRIAGE NOTES
In the past week pt had 5 positive preg tests. Yesterday started with brown spotting, today having heavy vag bleeding with clots     Triage Assessment     Row Name 05/19/22 1000       Triage Assessment (Adult)    Airway WDL WDL       Respiratory WDL    Respiratory WDL WDL       Skin Circulation/Temperature WDL    Skin Circulation/Temperature WDL WDL       Cardiac WDL    Cardiac WDL WDL       Cognitive/Neuro/Behavioral WDL    Cognitive/Neuro/Behavioral WDL WDL

## 2022-05-19 NOTE — ED PROVIDER NOTES
"  History   Chief Complaint:  Vaginal Bleeding      HPI   Omar Chatterjee is a  (LMP 22) 27 year old female who presents accompanied by her  for evaluation of vaginal bleeding. Recently the patient has developed some abnormal fatigue, abdominal cramping, and nausea, and on 22 she had her first positive home pregnancy test. Since then she has developed some intermittent diarrhea. Yesterday she started to develop some spotting, however later in the evening she started to develop heavier dark brown vaginal bleeding, prompting her to come into the ED this morning with primary concern for the state of her pregnancy. She reports that her blood type is O+. She has not had any recent fever or vomiting.  She did take a home pregnancy test that is now negative as of yesterday.    Review of Systems   Constitutional: Negative for fever.   Gastrointestinal: Positive for abdominal pain, diarrhea and nausea. Negative for vomiting.   Genitourinary: Positive for vaginal bleeding.   All other systems reviewed and are negative.    Allergies:  Erythromycin     Medications:  Levothyroxine     Past Medical History:     Hashimoto's thyroiditis   Thyroid nodule      Family History:    Hypertension - Mother      Social History:  Marital status:   The patient presents to the ED accompanied by her .     Physical Exam     Patient Vitals for the past 24 hrs:   BP Temp Pulse Resp SpO2 Height Weight   22 1322 113/88 -- 99 -- 97 % -- --   22 1320 -- -- 86 -- -- -- --   22 1000 (!) 146/95 98.2  F (36.8  C) 112 20 99 % 1.676 m (5' 6\") 76.2 kg (168 lb)       Physical Exam  Physical Exam   General:  Sitting on bed with  at bedside.   HENT:  No obvious trauma to head  Right Ear:  External ear normal.   Left Ear:  External ear normal.   Nose:  Nose normal.   Eyes:  Conjunctivae and EOM are normal. Pupils are equal, round, and reactive.   Neck: Normal range of motion. Neck supple. No tracheal " deviation present.   CV:  Normal heart sounds. No murmur heard.  Pulm/Chest: Effort normal and breath sounds normal.   Abd: Soft. No distension. Mild suprapubic tenderness. There is no rigidity, no rebound and no guarding.   M/S: Normal range of motion.   Neuro: Alert. GCS 15.  Skin: Skin is warm and dry. No rash noted. Not diaphoretic.   Psych: Normal mood and affect. Behavior is normal.      Emergency Department Course     Imaging:  US OB <14 Weeks w Transvaginal Single   Preliminary Result   IMPRESSION:    1. No intrauterine pregnancy is identified. In the setting of a   positive pregnancy test, differential considerations include an   intrauterine pregnancy too early to visualize, a spontaneous    in progress, or possibly an ectopic pregnancy. Clinical correlation   and close followup are recommended.   2. Small amount of free fluid in the pelvis is nonspecific, and could   be physiologic.    3. Small probable intramural fibroid near the fundus measures 0.8 cm.      Report per radiology    Laboratory:  Labs Ordered and Resulted from Time of ED Arrival to Time of ED Departure   ROUTINE UA WITH MICROSCOPIC REFLEX TO CULTURE - Abnormal       Result Value    Color Urine Straw      Appearance Urine Clear      Glucose Urine Negative      Bilirubin Urine Negative      Ketones Urine Trace (*)     Specific Gravity Urine 1.004      Blood Urine Large (*)     pH Urine 7.0      Protein Albumin Urine Negative      Urobilinogen Urine Normal      Nitrite Urine Negative      Leukocyte Esterase Urine Negative      RBC Urine 1      WBC Urine <1      Squamous Epithelials Urine 2 (*)    BASIC METABOLIC PANEL - Normal    Sodium 138      Potassium 3.7      Chloride 107      Carbon Dioxide (CO2) 24      Anion Gap 7      Urea Nitrogen 8      Creatinine 0.67      Calcium 9.1      Glucose 86      GFR Estimate >90     HCG QUANTITATIVE PREGNANCY - Normal    hCG Quantitative 1     CBC WITH PLATELETS AND DIFFERENTIAL    WBC Count 8.1       RBC Count 4.73      Hemoglobin 14.2      Hematocrit 42.8      MCV 91      MCH 30.0      MCHC 33.2      RDW 12.5      Platelet Count 241      % Neutrophils 74      % Lymphocytes 18      % Monocytes 6      % Eosinophils 1      % Basophils 1      % Immature Granulocytes 0      NRBCs per 100 WBC 0      Absolute Neutrophils 6.1      Absolute Lymphocytes 1.5      Absolute Monocytes 0.5      Absolute Eosinophils 0.0      Absolute Basophils 0.0      Absolute Immature Granulocytes 0.0      Absolute NRBCs 0.0     TYPE AND SCREEN, ADULT    ABO/RH(D) O POS      Antibody Screen Negative      SPECIMEN EXPIRATION DATE [id number]     ABO/RH TYPE AND SCREEN        Emergency Department Course:     Reviewed:  I reviewed nursing notes, vitals and past medical history    Assessments:  1150:  I obtained history and examined the patient as noted above.     1316: I updated and reassessed the patient.     Disposition:  The patient was discharged to home.     Impression & Plan   Medical Decision Making:  Omar Chatterjee is a very pleasant 27 year old female who presents for evaluation of vaginal bleeding.  The workup here shows likely miscarriage based upon having positive home pregnancy test and now not being pregnant.  I considered a broad differential including ovarian cyst, UTI, pyelonephritis, subchorionic hemorrhage, uterine bleeding, active miscarriage, constipation, etc.  More rare but serious etiologies considered included ectopic pregnancy, appendicitis, cholecystitis, volvulus, intraabdominal abscess, heterotopic pregnancy, etc. I did discuss the incredibly rare possibility of ectopic pregnancy.  Urine shows no signs of infection.  She is hemodynamically stable.  In this patient, there are no signs of serious etiologies of abdominal pain.  Supportive outpatient management is therefore indicated.  Plan is home, close follow-up with OB, threatened miscarriage precautions, and return to ED for worsening pain, heavy vaginal  bleeding (more than 1 pad soaked every hour).  Questions were answered.  Rh+ and does not need RhoGAM at this time.    The treatment plan was discussed with the patient and they expressed understanding of this plan and consented to the plan.  In addition, the patient will return to the emergency department if their symptoms persist, worsen, if new symptoms arise or if there is any concern as other pathology may be present that is not evident at this time. They also understand the importance of close follow up in the clinic and if unable to do so will return to the emergency department for a reevaluation. All questions were answered.    Diagnosis:    ICD-10-CM    1. Vaginal bleeding  N93.9    2. Miscarriage  O03.9         Scribe Disclosure:  ISundeep, am serving as a scribe at 11:38 AM on 5/19/2022 to document services personally performed by Marcus Horan DO based on my observations and the provider's statements to me.           Marcus Horan DO  05/19/22 4102

## 2022-05-25 DIAGNOSIS — E06.3 HASHIMOTO'S THYROIDITIS: ICD-10-CM

## 2022-05-25 NOTE — TELEPHONE ENCOUNTER
Routing refill request to provider for review/approval because:  TSH   Date Value Ref Range Status   04/20/2022 2.29 0.40 - 4.00 mU/L Final     Lab now normal ok for ongoing RF    Nehal Smith RN

## 2022-05-26 RX ORDER — LEVOTHYROXINE SODIUM 25 UG/1
TABLET ORAL
Qty: 90 TABLET | Refills: 0 | Status: SHIPPED | OUTPATIENT
Start: 2022-05-26 | End: 2023-10-13

## 2022-06-01 NOTE — TELEPHONE ENCOUNTER
FUTURE VISIT INFORMATION      FUTURE VISIT INFORMATION:    Date: 8/23/22    Time: 2PM    Location: [facility]  REFERRAL INFORMATION:    Referring provider:  Glenda Harris MD    Referring providers clinic:  ???    Reason for visit/diagnosis  Difficulty Swallowing (Dysphagia), ref by Glenda Harris MD, recs to be faxed - sched per pt online req    RECORDS REQUESTED FROM:       Clinic name Comments Records Status Imaging Status   [facility]   10/4/19 note from Cyndee Cherry PA  Care everywhere     [facility]   6/30/22 note from Councilman, David L, MD   Care everywhere     [facility]   7/1/22 note from Marcus Christensen MD   Care everywhere     [facility]   Fx. [phone] [facility]    7/20/22 XR Cervical Spine - PACS    [facility]  4/7/22 XR Cervical Spine - PACS  10/19/21 MR Cervical Spine   1/21/21 MR Cervical Spine      Care everywhere  req 7/26/22, 8/10/22 - PACS                 7/26/22 3:43PM sent a fax for images - [facility]   8/10/22 4:37PM sent a 2nd fax for images - [facility]  8/12/22 7AM images received, waiting for patient to return phone call - [facility]

## 2022-06-02 ENCOUNTER — LAB (OUTPATIENT)
Dept: LAB | Facility: CLINIC | Age: 27
End: 2022-06-02
Payer: COMMERCIAL

## 2022-06-02 DIAGNOSIS — E06.3 HASHIMOTO'S THYROIDITIS: ICD-10-CM

## 2022-06-02 PROCEDURE — 84443 ASSAY THYROID STIM HORMONE: CPT

## 2022-06-02 PROCEDURE — 36415 COLL VENOUS BLD VENIPUNCTURE: CPT

## 2022-06-03 LAB — TSH SERPL DL<=0.005 MIU/L-ACNC: 1.61 MU/L (ref 0.4–4)

## 2022-07-26 NOTE — TELEPHONE ENCOUNTER
Left patient a message to see if she has any outside records. Left my direct number for call back [phone] (ok to leave detailed voicemail). If she has seen someone or been evaluated by an ENT, I would like to get more information and send her a release form to sign to try to obtain those records before her appointment.     [facility]   Clinic

## 2022-08-10 ENCOUNTER — LAB REQUISITION (OUTPATIENT)
Dept: LAB | Facility: CLINIC | Age: 27
End: 2022-08-10

## 2022-08-10 DIAGNOSIS — Z34.81 ENCOUNTER FOR SUPERVISION OF OTHER NORMAL PREGNANCY, FIRST TRIMESTER: ICD-10-CM

## 2022-08-10 LAB
ABO (EXTERNAL): NORMAL
ABO/RH(D): NORMAL
ANTIBODY SCREEN: NEGATIVE
BASOPHILS # BLD AUTO: 0.1 10E3/UL (ref 0–0.2)
BASOPHILS NFR BLD AUTO: 1 %
EOSINOPHIL # BLD AUTO: 0 10E3/UL (ref 0–0.7)
EOSINOPHIL NFR BLD AUTO: 0 %
ERYTHROCYTE [DISTWIDTH] IN BLOOD BY AUTOMATED COUNT: 12.8 % (ref 10–15)
HCT VFR BLD AUTO: 40.8 % (ref 35–47)
HGB BLD-MCNC: 13.8 G/DL (ref 11.7–15.7)
HIV1+2 AB SERPL QL IA: NEGATIVE
HOLD SPECIMEN: NORMAL
IMM GRANULOCYTES # BLD: 0 10E3/UL
IMM GRANULOCYTES NFR BLD: 0 %
LYMPHOCYTES # BLD AUTO: 1.9 10E3/UL (ref 0.8–5.3)
LYMPHOCYTES NFR BLD AUTO: 19 %
MCH RBC QN AUTO: 31 PG (ref 26.5–33)
MCHC RBC AUTO-ENTMCNC: 33.8 G/DL (ref 31.5–36.5)
MCV RBC AUTO: 92 FL (ref 78–100)
MONOCYTES # BLD AUTO: 0.7 10E3/UL (ref 0–1.3)
MONOCYTES NFR BLD AUTO: 7 %
NEUTROPHILS # BLD AUTO: 7.5 10E3/UL (ref 1.6–8.3)
NEUTROPHILS NFR BLD AUTO: 73 %
NRBC # BLD AUTO: 0 10E3/UL
NRBC BLD AUTO-RTO: 0 /100
PLATELET # BLD AUTO: 258 10E3/UL (ref 150–450)
RBC # BLD AUTO: 4.45 10E6/UL (ref 3.8–5.2)
RH (EXTERNAL): POSITIVE
SPECIMEN EXPIRATION DATE: NORMAL
T4 FREE SERPL-MCNC: 1.24 NG/DL (ref 0.9–1.7)
TSH SERPL DL<=0.005 MIU/L-ACNC: 1.86 UIU/ML (ref 0.3–4.2)
WBC # BLD AUTO: 10.2 10E3/UL (ref 4–11)

## 2022-08-10 PROCEDURE — 85025 COMPLETE CBC W/AUTO DIFF WBC: CPT | Performed by: NURSE PRACTITIONER

## 2022-08-10 PROCEDURE — 84443 ASSAY THYROID STIM HORMONE: CPT | Performed by: NURSE PRACTITIONER

## 2022-08-10 PROCEDURE — 86762 RUBELLA ANTIBODY: CPT | Performed by: NURSE PRACTITIONER

## 2022-08-10 PROCEDURE — 86592 SYPHILIS TEST NON-TREP QUAL: CPT | Performed by: NURSE PRACTITIONER

## 2022-08-10 PROCEDURE — 87340 HEPATITIS B SURFACE AG IA: CPT | Performed by: NURSE PRACTITIONER

## 2022-08-10 PROCEDURE — 87591 N.GONORRHOEAE DNA AMP PROB: CPT | Performed by: NURSE PRACTITIONER

## 2022-08-10 PROCEDURE — 86850 RBC ANTIBODY SCREEN: CPT | Performed by: NURSE PRACTITIONER

## 2022-08-10 PROCEDURE — 87086 URINE CULTURE/COLONY COUNT: CPT | Performed by: NURSE PRACTITIONER

## 2022-08-10 PROCEDURE — 86803 HEPATITIS C AB TEST: CPT | Performed by: NURSE PRACTITIONER

## 2022-08-10 PROCEDURE — 87389 HIV-1 AG W/HIV-1&-2 AB AG IA: CPT | Performed by: NURSE PRACTITIONER

## 2022-08-10 PROCEDURE — 84999 UNLISTED CHEMISTRY PROCEDURE: CPT | Performed by: NURSE PRACTITIONER

## 2022-08-10 PROCEDURE — 84439 ASSAY OF FREE THYROXINE: CPT | Performed by: NURSE PRACTITIONER

## 2022-08-10 PROCEDURE — 87491 CHLMYD TRACH DNA AMP PROBE: CPT | Performed by: NURSE PRACTITIONER

## 2022-08-10 PROCEDURE — 86901 BLOOD TYPING SEROLOGIC RH(D): CPT | Performed by: NURSE PRACTITIONER

## 2022-08-11 ENCOUNTER — TELEPHONE (OUTPATIENT)
Dept: OTOLARYNGOLOGY | Facility: CLINIC | Age: 27
End: 2022-08-11

## 2022-08-11 LAB
C TRACH DNA SPEC QL PROBE+SIG AMP: NEGATIVE
HBV SURFACE AG SERPL QL IA: NONREACTIVE
HCV AB SERPL QL IA: NONREACTIVE
HIV 1+2 AB+HIV1 P24 AG SERPL QL IA: NONREACTIVE
N GONORRHOEA DNA SPEC QL NAA+PROBE: NEGATIVE
RUBV IGG SERPL QL IA: 0.66 INDEX
RUBV IGG SERPL QL IA: NORMAL

## 2022-08-12 LAB
BACTERIA UR CULT: NORMAL
Lab: NORMAL
PERFORMING LABORATORY: NORMAL
SPECIMEN STATUS: NORMAL
TEST NAME: NORMAL

## 2022-08-14 LAB — MISCELLANEOUS TEST 1 (ARUP): NORMAL

## 2022-08-15 ENCOUNTER — HOSPITAL ENCOUNTER (EMERGENCY)
Facility: CLINIC | Age: 27
Discharge: HOME | End: 2022-08-15
Attending: EMERGENCY MEDICINE | Admitting: EMERGENCY MEDICINE
Payer: COMMERCIAL

## 2022-08-15 VITALS
RESPIRATION RATE: 16 BRPM | OXYGEN SATURATION: 100 % | TEMPERATURE: 97.7 F | HEART RATE: 106 BPM | DIASTOLIC BLOOD PRESSURE: 82 MMHG | SYSTOLIC BLOOD PRESSURE: 129 MMHG | HEIGHT: 66 IN | WEIGHT: 164 LBS | BODY MASS INDEX: 26.36 KG/M2

## 2022-08-15 DIAGNOSIS — O21.0 HYPEREMESIS GRAVIDARUM: ICD-10-CM

## 2022-08-15 LAB
ALBUMIN SERPL-MCNC: 3.9 G/DL (ref 3.4–5)
ALP SERPL-CCNC: 60 U/L (ref 40–150)
ALT SERPL W P-5'-P-CCNC: 17 U/L (ref 0–50)
ANION GAP SERPL CALCULATED.3IONS-SCNC: 8 MMOL/L (ref 3–14)
AST SERPL W P-5'-P-CCNC: 13 U/L (ref 0–45)
B-HCG SERPL-ACNC: ABNORMAL IU/L (ref 0–5)
BASOPHILS # BLD AUTO: 0 10E3/UL (ref 0–0.2)
BASOPHILS NFR BLD AUTO: 0 %
BILIRUB SERPL-MCNC: 0.3 MG/DL (ref 0.2–1.3)
BUN SERPL-MCNC: 7 MG/DL (ref 7–30)
CALCIUM SERPL-MCNC: 9.3 MG/DL (ref 8.5–10.1)
CHLORIDE BLD-SCNC: 107 MMOL/L (ref 94–109)
CO2 SERPL-SCNC: 23 MMOL/L (ref 20–32)
CREAT SERPL-MCNC: 0.5 MG/DL (ref 0.52–1.04)
EOSINOPHIL # BLD AUTO: 0 10E3/UL (ref 0–0.7)
EOSINOPHIL NFR BLD AUTO: 0 %
ERYTHROCYTE [DISTWIDTH] IN BLOOD BY AUTOMATED COUNT: 12.5 % (ref 10–15)
GFR SERPL CREATININE-BSD FRML MDRD: >90 ML/MIN/1.73M2
GLUCOSE BLD-MCNC: 79 MG/DL (ref 70–99)
HCT VFR BLD AUTO: 42.4 % (ref 35–47)
HGB BLD-MCNC: 14.3 G/DL (ref 11.7–15.7)
IMM GRANULOCYTES # BLD: 0 10E3/UL
IMM GRANULOCYTES NFR BLD: 0 %
LYMPHOCYTES # BLD AUTO: 1.8 10E3/UL (ref 0.8–5.3)
LYMPHOCYTES NFR BLD AUTO: 18 %
MCH RBC QN AUTO: 30.9 PG (ref 26.5–33)
MCHC RBC AUTO-ENTMCNC: 33.7 G/DL (ref 31.5–36.5)
MCV RBC AUTO: 92 FL (ref 78–100)
MONOCYTES # BLD AUTO: 0.6 10E3/UL (ref 0–1.3)
MONOCYTES NFR BLD AUTO: 6 %
NEUTROPHILS # BLD AUTO: 7.5 10E3/UL (ref 1.6–8.3)
NEUTROPHILS NFR BLD AUTO: 76 %
NRBC # BLD AUTO: 0 10E3/UL
NRBC BLD AUTO-RTO: 0 /100
PLATELET # BLD AUTO: 215 10E3/UL (ref 150–450)
POTASSIUM BLD-SCNC: 3.7 MMOL/L (ref 3.4–5.3)
PROT SERPL-MCNC: 7.6 G/DL (ref 6.8–8.8)
RBC # BLD AUTO: 4.63 10E6/UL (ref 3.8–5.2)
SODIUM SERPL-SCNC: 138 MMOL/L (ref 133–144)
WBC # BLD AUTO: 10 10E3/UL (ref 4–11)

## 2022-08-15 PROCEDURE — 99284 EMERGENCY DEPT VISIT MOD MDM: CPT | Mod: 25

## 2022-08-15 PROCEDURE — 96374 THER/PROPH/DIAG INJ IV PUSH: CPT

## 2022-08-15 PROCEDURE — 80053 COMPREHEN METABOLIC PANEL: CPT | Performed by: EMERGENCY MEDICINE

## 2022-08-15 PROCEDURE — 85025 COMPLETE CBC W/AUTO DIFF WBC: CPT | Performed by: EMERGENCY MEDICINE

## 2022-08-15 PROCEDURE — 84702 CHORIONIC GONADOTROPIN TEST: CPT | Performed by: EMERGENCY MEDICINE

## 2022-08-15 PROCEDURE — 250N000011 HC RX IP 250 OP 636: Performed by: EMERGENCY MEDICINE

## 2022-08-15 PROCEDURE — 258N000003 HC RX IP 258 OP 636: Performed by: EMERGENCY MEDICINE

## 2022-08-15 PROCEDURE — 36415 COLL VENOUS BLD VENIPUNCTURE: CPT | Performed by: EMERGENCY MEDICINE

## 2022-08-15 PROCEDURE — 96361 HYDRATE IV INFUSION ADD-ON: CPT

## 2022-08-15 RX ORDER — ONDANSETRON 2 MG/ML
4 INJECTION INTRAMUSCULAR; INTRAVENOUS EVERY 30 MIN PRN
Status: DISCONTINUED | OUTPATIENT
Start: 2022-08-15 | End: 2022-08-15 | Stop reason: HOSPADM

## 2022-08-15 RX ORDER — METOCLOPRAMIDE 10 MG/1
10 TABLET ORAL 3 TIMES DAILY PRN
Qty: 20 TABLET | Refills: 0 | Status: ON HOLD | OUTPATIENT
Start: 2022-08-15 | End: 2023-03-13

## 2022-08-15 RX ADMIN — SODIUM CHLORIDE 1000 ML: 9 INJECTION, SOLUTION INTRAVENOUS at 08:20

## 2022-08-15 RX ADMIN — ONDANSETRON 4 MG: 2 INJECTION INTRAMUSCULAR; INTRAVENOUS at 08:20

## 2022-08-15 ASSESSMENT — ENCOUNTER SYMPTOMS
VOMITING: 1
NAUSEA: 1
ABDOMINAL PAIN: 0

## 2022-08-15 ASSESSMENT — ACTIVITIES OF DAILY LIVING (ADL): ADLS_ACUITY_SCORE: 35

## 2022-08-15 NOTE — ED TRIAGE NOTES
Triage Assessment     Row Name 08/15/22 0750       Triage Assessment (Adult)    Airway WDL WDL       Respiratory WDL    Respiratory WDL WDL       Skin Circulation/Temperature WDL    Skin Circulation/Temperature WDL WDL       Cardiac WDL    Cardiac WDL WDL       Peripheral/Neurovascular WDL    Peripheral Neurovascular WDL WDL       Cognitive/Neuro/Behavioral WDL    Cognitive/Neuro/Behavioral WDL WDL

## 2022-08-15 NOTE — ED PROVIDER NOTES
"  History   Chief Complaint:  Hyperemesis       The history is provided by the patient.      Omar Chatterjee is an 8 week pregnant 27 year old female, , with history of Hashimoto's disease who presents with hyperemesis.  She has been dealing with nausea and vomiting for a while now, and was seen by her OBGYN last week where was prescribed Bojensta. Then last night, she started having difficulty keeping food and fluids down. She tried small sips of water this morning, and she would immediately vomit. She denies vaginal bleeding, vaginal discharge, or cramping.      Review of Systems   Gastrointestinal: Positive for nausea and vomiting. Negative for abdominal pain.   Genitourinary: Negative for vaginal bleeding and vaginal pain.   All other systems reviewed and are negative.    Allergies:  Erythromycin    Medications:  Synthroid   Bojensta     Past Medical History:     Hashimoto's thyroiditis   Thyroid nodule    Hypovitaminosis D   TBI   Asthma     Past Surgical History:    The patient denies past surgical history.     Family History:    Mother - HTN     Social History:  The patient presents to the ED with her  via private vehicle   PCP: [facility], [facility]   Hx of alcohol use     Physical Exam     Patient Vitals for the past 24 hrs:   BP Temp Temp src Pulse Resp SpO2 Height Weight   08/15/22 0748 129/82 97.7  F (36.5  C) Temporal 106 16 100 % 1.676 m (5' 6\") 74.4 kg (164 lb)       Physical Exam  General: Patient is alert and normal appearing.  HEENT: Head atraumatic    Eyes: pupils equal and reactive. Conjunctiva clear   Nares: patent   Oropharynx: no lesions, uvula midline, no palatal draping, normal voice, no trismus  Neck: Supple without lymphadenopathy, no meningismus  Chest: Tachycardic but regular.   Lungs: Equal clear to auscultation with no wheeze or rales  Abdomen: Soft, non tender, nondistended, normal bowel sounds  Back: No costovertebral angle tenderness, no midline C, T or L spine " tenderness  Neuro: Grossly nonfocal, normal speech, strength equal bilaterally, CN 2-12 intact  Extremities: No deformities, equal radial and DP pulses. No clubbing, cyanosis.  No edema  Skin: Warm and dry with no rash.       Emergency Department Course   Laboratory:  Labs Ordered and Resulted from Time of ED Arrival to Time of ED Departure   COMPREHENSIVE METABOLIC PANEL - Abnormal       Result Value    Sodium 138      Potassium 3.7      Chloride 107      Carbon Dioxide (CO2) 23      Anion Gap 8      Urea Nitrogen 7      Creatinine 0.50 (*)     Calcium 9.3      Glucose 79      Alkaline Phosphatase 60      AST 13      ALT 17      Protein Total 7.6      Albumin 3.9      Bilirubin Total 0.3      GFR Estimate >90     HCG QUANTITATIVE PREGNANCY - Abnormal    hCG Quantitative 104,336 (*)    CBC WITH PLATELETS AND DIFFERENTIAL    WBC Count 10.0      RBC Count 4.63      Hemoglobin 14.3      Hematocrit 42.4      MCV 92      MCH 30.9      MCHC 33.7      RDW 12.5      Platelet Count 215      % Neutrophils 76      % Lymphocytes 18      % Monocytes 6      % Eosinophils 0      % Basophils 0      % Immature Granulocytes 0      NRBCs per 100 WBC 0      Absolute Neutrophils 7.5      Absolute Lymphocytes 1.8      Absolute Monocytes 0.6      Absolute Eosinophils 0.0      Absolute Basophils 0.0      Absolute Immature Granulocytes 0.0      Absolute NRBCs 0.0       Emergency Department Course:    Reviewed:  I reviewed nursing notes, vitals and past medical history    Assessments:  0755 I obtained history and examined the patient as noted above.   0925 I rechecked the patient and explained findings. She is now able to eat. I discussed plan for discharge home.    Interventions:  0802 NS 1 L IV  0820 Zofran 4 mg IV     Disposition:  The patient was discharged to home.     Impression & Plan   Medical Decision Making:  Omar Chatterjee is a 27 year old female who presents for evaluation of nausea and vomiting.  She is currently pregnant.   Nonetheless. I considered a broad differential diagnosis for this patient including viral gastroenteritis, food poisoning, bowel obstruction, intra-abdominal infection such as colitis, cholecystitis, UTI, pyelonephritis, appendicitis, etc.  There are no signs of worrisome intra-abdominal pathologies detected during the visit today.  The patient has a completely benign abdominal exam without rebound, guarding, or marked tenderness to palpation.  I doubt ectopic pregnancy based on patient with previous OB appointment last week demonstrating intrauterine pregnancy.      Supportive outpatient management is therefore indicated.  Abdominal pain precautions are given for home.    It was discussed with the patient to return to the ED for blood in stool, increasing pain, or fevers more than 102.   Feels much improved after interventions in ED.  See above for medications for home.  I believe all of her symptoms are at this point explained by the pregnancy and hyperemesis gravidarum.         Diagnosis:    ICD-10-CM    1. Hyperemesis gravidarum  O21.0        Discharge Medications:  New Prescriptions    METOCLOPRAMIDE (REGLAN) 10 MG TABLET    Take 1 tablet (10 mg) by mouth 3 times daily as needed (nausea and vomiting)       Scribe Disclosure:  I, David Wang, am serving as a scribe at 7:55 AM on 8/15/2022 to document services personally performed by Stacey Payton MD based on my observations and the provider's statements to me.        Stacey Payton MD  08/15/22 0991

## 2022-08-16 ENCOUNTER — HOSPITAL ENCOUNTER (EMERGENCY)
Facility: CLINIC | Age: 27
Discharge: HOME | End: 2022-08-16
Attending: PHYSICIAN ASSISTANT | Admitting: PHYSICIAN ASSISTANT
Payer: COMMERCIAL

## 2022-08-16 VITALS
BODY MASS INDEX: 26.36 KG/M2 | OXYGEN SATURATION: 99 % | SYSTOLIC BLOOD PRESSURE: 132 MMHG | HEART RATE: 94 BPM | TEMPERATURE: 98.2 F | WEIGHT: 164 LBS | DIASTOLIC BLOOD PRESSURE: 73 MMHG | HEIGHT: 66 IN | RESPIRATION RATE: 16 BRPM

## 2022-08-16 DIAGNOSIS — T78.40XA ALLERGIC REACTION, INITIAL ENCOUNTER: ICD-10-CM

## 2022-08-16 DIAGNOSIS — O21.9 NAUSEA/VOMITING IN PREGNANCY: ICD-10-CM

## 2022-08-16 LAB
ALBUMIN SERPL-MCNC: 4.1 G/DL (ref 3.4–5)
ALP SERPL-CCNC: 59 U/L (ref 40–150)
ALT SERPL W P-5'-P-CCNC: 19 U/L (ref 0–50)
ANION GAP SERPL CALCULATED.3IONS-SCNC: 8 MMOL/L (ref 3–14)
AST SERPL W P-5'-P-CCNC: 14 U/L (ref 0–45)
BASOPHILS # BLD AUTO: 0 10E3/UL (ref 0–0.2)
BASOPHILS NFR BLD AUTO: 0 %
BILIRUB SERPL-MCNC: 0.2 MG/DL (ref 0.2–1.3)
BUN SERPL-MCNC: 6 MG/DL (ref 7–30)
CALCIUM SERPL-MCNC: 9.4 MG/DL (ref 8.5–10.1)
CHLORIDE BLD-SCNC: 106 MMOL/L (ref 94–109)
CO2 SERPL-SCNC: 24 MMOL/L (ref 20–32)
CREAT SERPL-MCNC: 0.5 MG/DL (ref 0.52–1.04)
EOSINOPHIL # BLD AUTO: 0 10E3/UL (ref 0–0.7)
EOSINOPHIL NFR BLD AUTO: 0 %
ERYTHROCYTE [DISTWIDTH] IN BLOOD BY AUTOMATED COUNT: 12.3 % (ref 10–15)
GFR SERPL CREATININE-BSD FRML MDRD: >90 ML/MIN/1.73M2
GLUCOSE BLD-MCNC: 102 MG/DL (ref 70–99)
HCT VFR BLD AUTO: 39.7 % (ref 35–47)
HGB BLD-MCNC: 13.5 G/DL (ref 11.7–15.7)
HOLD SPECIMEN: NORMAL
HOLD SPECIMEN: NORMAL
IMM GRANULOCYTES # BLD: 0 10E3/UL
IMM GRANULOCYTES NFR BLD: 0 %
LYMPHOCYTES # BLD AUTO: 1.4 10E3/UL (ref 0.8–5.3)
LYMPHOCYTES NFR BLD AUTO: 13 %
MCH RBC QN AUTO: 30.8 PG (ref 26.5–33)
MCHC RBC AUTO-ENTMCNC: 34 G/DL (ref 31.5–36.5)
MCV RBC AUTO: 91 FL (ref 78–100)
MONOCYTES # BLD AUTO: 0.5 10E3/UL (ref 0–1.3)
MONOCYTES NFR BLD AUTO: 5 %
NEUTROPHILS # BLD AUTO: 8.4 10E3/UL (ref 1.6–8.3)
NEUTROPHILS NFR BLD AUTO: 82 %
NRBC # BLD AUTO: 0 10E3/UL
NRBC BLD AUTO-RTO: 0 /100
PLATELET # BLD AUTO: 218 10E3/UL (ref 150–450)
POTASSIUM BLD-SCNC: 3.3 MMOL/L (ref 3.4–5.3)
PROT SERPL-MCNC: 7.8 G/DL (ref 6.8–8.8)
RBC # BLD AUTO: 4.38 10E6/UL (ref 3.8–5.2)
SODIUM SERPL-SCNC: 138 MMOL/L (ref 133–144)
WBC # BLD AUTO: 10.3 10E3/UL (ref 4–11)

## 2022-08-16 PROCEDURE — 85025 COMPLETE CBC W/AUTO DIFF WBC: CPT | Performed by: EMERGENCY MEDICINE

## 2022-08-16 PROCEDURE — 80053 COMPREHEN METABOLIC PANEL: CPT | Performed by: EMERGENCY MEDICINE

## 2022-08-16 PROCEDURE — 250N000011 HC RX IP 250 OP 636: Performed by: PHYSICIAN ASSISTANT

## 2022-08-16 PROCEDURE — 96374 THER/PROPH/DIAG INJ IV PUSH: CPT

## 2022-08-16 PROCEDURE — 96376 TX/PRO/DX INJ SAME DRUG ADON: CPT

## 2022-08-16 PROCEDURE — 250N000011 HC RX IP 250 OP 636

## 2022-08-16 PROCEDURE — 36415 COLL VENOUS BLD VENIPUNCTURE: CPT | Performed by: EMERGENCY MEDICINE

## 2022-08-16 PROCEDURE — 82040 ASSAY OF SERUM ALBUMIN: CPT | Performed by: EMERGENCY MEDICINE

## 2022-08-16 PROCEDURE — 99284 EMERGENCY DEPT VISIT MOD MDM: CPT | Mod: 25

## 2022-08-16 RX ORDER — EPINEPHRINE 0.3 MG/.3ML
0.3 INJECTION SUBCUTANEOUS
Qty: 2 EACH | Refills: 0 | Status: ON HOLD | OUTPATIENT
Start: 2022-08-16 | End: 2023-03-13

## 2022-08-16 RX ORDER — DIPHENHYDRAMINE HYDROCHLORIDE 50 MG/ML
25 INJECTION INTRAMUSCULAR; INTRAVENOUS ONCE
Status: COMPLETED | OUTPATIENT
Start: 2022-08-16 | End: 2022-08-16

## 2022-08-16 RX ORDER — ONDANSETRON 4 MG/1
4 TABLET, ORALLY DISINTEGRATING ORAL EVERY 8 HOURS PRN
Qty: 15 TABLET | Refills: 0 | Status: ON HOLD | OUTPATIENT
Start: 2022-08-16 | End: 2023-03-15

## 2022-08-16 RX ORDER — DIPHENHYDRAMINE HYDROCHLORIDE 50 MG/ML
INJECTION INTRAMUSCULAR; INTRAVENOUS
Status: COMPLETED
Start: 2022-08-16 | End: 2022-08-16

## 2022-08-16 RX ADMIN — DIPHENHYDRAMINE HYDROCHLORIDE 25 MG: 50 INJECTION, SOLUTION INTRAMUSCULAR; INTRAVENOUS at 12:24

## 2022-08-16 RX ADMIN — DIPHENHYDRAMINE HYDROCHLORIDE 25 MG: 50 INJECTION INTRAMUSCULAR; INTRAVENOUS at 12:24

## 2022-08-16 RX ADMIN — DIPHENHYDRAMINE HYDROCHLORIDE 25 MG: 50 INJECTION, SOLUTION INTRAMUSCULAR; INTRAVENOUS at 15:01

## 2022-08-16 ASSESSMENT — ACTIVITIES OF DAILY LIVING (ADL): ADLS_ACUITY_SCORE: 35

## 2022-08-16 ASSESSMENT — ENCOUNTER SYMPTOMS: ABDOMINAL PAIN: 0

## 2022-08-16 NOTE — ED NOTES
Rapid Assessment Note    History:   Omar Chatterjee is a 27 year old female, 8 weeks pregnant, who presents for evaluation of jaw tightening and tongue tingling. 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 She notes that symptoms are continuing to get worse after 2 hours. The patient began taking Reglan yesterday, noting she has only taken 2 pills so far, and has not done anything else new.     Exam:   General: Alert, No distress. Nontoxic appearance  Head: No signs of trauma.   Mouth/Throat: Oropharynx moist.   Eyes: Conjunctivae are normal. Pupils are equal..   Neck: Normal range of motion.    CV: Appears well perfused.  Resp:No respiratory distress.   MSK: Normal range of motion. No obvious deformity.   Neuro: The patient is alert and interactive. [place]. Speech normal. GCS 15  Skin: No lesion or sign of trauma noted.   Psych: normal mood and affect. behavior is normal.      Plan of Care:   I evaluated the patient and developed an initial plan of care. I discussed this plan and explained that I, or one of my partners, would be returning to complete the evaluation.   I ordered labs and 25 mg Benadryl.    I, Mili Wakeeney, am serving as a scribe to document services personally performed by Mayur Hodge MD based on my observations and the provider's statements to me.    08/16/2022  [employer]    Portions of this medical record were completed by a scribe. UPON MY REVIEW AND AUTHENTICATION BY ELECTRONIC SIGNATURE, this confirms (a) I performed the applicable clinical services, and (b) the record is accurate.        Mayur Hodge MD  08/16/22 0353

## 2022-08-16 NOTE — ED NOTES
REports feeling much better after benadryl.  Sitting by triage desk.  Feels swelling going down and rates discomfort 1/10.

## 2022-08-16 NOTE — Clinical Note
Omar Chatterjee was seen and treated in our emergency department on 8/16/2022.  She may return to work on 08/18/2022.       If you have any questions or concerns, please don't hesitate to call.      Carrillo Crawford PA-C

## 2022-08-16 NOTE — ED PROVIDER NOTES
History     Chief Complaint:  Medication Reaction       HPI   Omar Chatterjee is a 27 year old female who is currently 8 weeks pregnant, presents to the ER for evaluation of concerns for an allergic reaction.  She notes she took a new medication, Reglan last evening and then 1 more time this morning.  Shortly thereafter, she noticed some throat swelling, lip and tongue tingling.  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  The patient reports that she feels much better after taking a dose of Benadryl.  She has no other symptoms at this time such as vomiting, nausea, abdominal pain, hives or other.    ROS:  Review of Systems   Gastrointestinal: Negative for abdominal pain.   Skin: Negative for rash.        All other systems reviewed and are negative.    Allergies:  Erythromycin     Medications:    EPINEPHrine (ANY BX GENERIC EQUIV) 0.3 MG/0.3ML injection 2-pack  ondansetron (ZOFRAN ODT) 4 MG ODT tab  levothyroxine (SYNTHROID/LEVOTHROID) 25 MCG tablet  metoclopramide (REGLAN) 10 MG tablet  Prenatal MV & Min w/FA-DHA (PRENATAL ADULT GUMMY/DHA/FA PO)        Past Medical History:    Past Medical History:   Diagnosis Date     Hashimoto's thyroiditis      NO ACTIVE PROBLEMS      Thyroid nodule      Patient Active Problem List   Diagnosis     Hashimoto's thyroiditis     Thyroid nodule        Past Surgical History:    No past surgical history on file.     Family History:    family history includes Hypertension in her mother.    Social History:   reports that she has never smoked. She has never used smokeless tobacco. She reports that she does not drink alcohol and does not use drugs.  PCP: [facility]     Physical Exam     Patient Vitals for the past 24 hrs:   BP Temp Temp src Pulse Resp SpO2 Height Weight   08/16/22 1502 -- -- -- -- -- 99 % -- --   08/16/22 1317 132/73 -- -- 94 16 99 % -- --   08/16/22 1203 (!) 145/89 98.2  F (36.8  C) Temporal 118 20 100 %  "1.676 m (5' 6\") 74.4 kg (164 lb)        Physical Exam  General: Well appearing, pleasant female, resting on exam bed  HEENT: No evidence of trauma.  Conjunctive are clear.  Extraocular eye movements intact.  Neck range of motion intact.  Nose and throat clear.  Respiratory: Good breath sounds bilaterally  Cardiovascular: Normal rate and rhythm   Gastrointestinal: Soft, nontender.   Musculoskeletal: Atraumatic  Skin: Exposed skin clear.  Neurologic: Alert.  Psych:  Patient is cooperative, with normal affect.      Emergency Department Course   ECG:  none    Imaging:  No orders to display        Laboratory:  Labs Ordered and Resulted from Time of ED Arrival to Time of ED Departure   COMPREHENSIVE METABOLIC PANEL - Abnormal       Result Value    Sodium 138      Potassium 3.3 (*)     Chloride 106      Carbon Dioxide (CO2) 24      Anion Gap 8      Urea Nitrogen 6 (*)     Creatinine 0.50 (*)     Calcium 9.4      Glucose 102 (*)     Alkaline Phosphatase 59      AST 14      ALT 19      Protein Total 7.8      Albumin 4.1      Bilirubin Total 0.2      GFR Estimate >90     CBC WITH PLATELETS AND DIFFERENTIAL - Abnormal    WBC Count 10.3      RBC Count 4.38      Hemoglobin 13.5      Hematocrit 39.7      MCV 91      MCH 30.8      MCHC 34.0      RDW 12.3      Platelet Count 218      % Neutrophils 82      % Lymphocytes 13      % Monocytes 5      % Eosinophils 0      % Basophils 0      % Immature Granulocytes 0      NRBCs per 100 WBC 0      Absolute Neutrophils 8.4 (*)     Absolute Lymphocytes 1.4      Absolute Monocytes 0.5      Absolute Eosinophils 0.0      Absolute Basophils 0.0      Absolute Immature Granulocytes 0.0      Absolute NRBCs 0.0          Interventions:  Medications   diphenhydrAMINE (BENADRYL) injection 25 mg (25 mg Intravenous Given 8/16/22 1224)   diphenhydrAMINE (BENADRYL) injection 25 mg (25 mg Intravenous Given 8/16/22 1501)        Impression & Plan      Medical Decision Making:  Omar Chatterjee is a 27 year old " female who is currently 8 weeks pregnant, with history of thyroid disease, presents to the ER for evaluation of concerns of an allergic reaction.  See HPI.  Her vitals are unremarkable.  She has a reassuring exam and is in no acute distress.  She was given Benadryl in the lobby and feels better.  Could be an allergic type reaction given that this was her second exposure.  We will have her discontinue Reglan and provide Zofran for any breakthrough nausea secondary to her pregnancy.  She has no signs or symptoms for anaphylaxis or angioedema currently.  We will provide an EpiPen should symptoms return but I think that this would be unlikely.  She is to return with new or worsening symptoms.  No further questions and agrees with plan.    Addendum: Patient symptoms returned so we redosed with Benadryl.  Repeat exam and assessment reveals that she has improved once again.  She can continue Benadryl going forward.  She is to return with new or worsening symptoms.  No further questions agrees with plan.    Diagnosis:    ICD-10-CM    1. Allergic reaction, initial encounter  T78.40XA    2. Nausea/vomiting in pregnancy  O21.9         Discharge Medications:  New Prescriptions    EPINEPHRINE (ANY BX GENERIC EQUIV) 0.3 MG/0.3ML INJECTION 2-PACK    Inject 0.3 mLs (0.3 mg) into the muscle once as needed for anaphylaxis May repeat one time in 5-15 minutes if response to initial dose is inadequate.    ONDANSETRON (ZOFRAN ODT) 4 MG ODT TAB    Take 1 tablet (4 mg) by mouth every 8 hours as needed for nausea        8/16/2022   Carrillo Crawford PA-C Cyr, Matthew R, PA-C  08/16/22 1429       Carrillo Crawford PA-C  08/16/22 1548

## 2022-08-16 NOTE — ED TRIAGE NOTES
Pt took reglan yesterday and this morning. Pt. Was at work and started feeling jaw tightening and tongue tingling.      Triage Assessment     Row Name 08/16/22 1206       Triage Assessment (Adult)    Airway WDL airway symptoms  tongue feels tingling feeling, jaw tightness       Respiratory WDL    Respiratory WDL WDL       Skin Circulation/Temperature WDL    Skin Circulation/Temperature WDL WDL       Cardiac WDL    Cardiac WDL X  tachy       Peripheral/Neurovascular WDL    Peripheral Neurovascular WDL WDL       Cognitive/Neuro/Behavioral WDL    Cognitive/Neuro/Behavioral WDL WDL

## 2022-08-21 ENCOUNTER — HOSPITAL ENCOUNTER (EMERGENCY)
Facility: CLINIC | Age: 27
Discharge: HOME | End: 2022-08-21
Attending: EMERGENCY MEDICINE | Admitting: EMERGENCY MEDICINE
Payer: COMMERCIAL

## 2022-08-21 VITALS
DIASTOLIC BLOOD PRESSURE: 77 MMHG | SYSTOLIC BLOOD PRESSURE: 111 MMHG | OXYGEN SATURATION: 99 % | TEMPERATURE: 98.7 F | HEART RATE: 87 BPM | RESPIRATION RATE: 18 BRPM

## 2022-08-21 DIAGNOSIS — O21.0 HYPEREMESIS GRAVIDARUM: ICD-10-CM

## 2022-08-21 LAB
ALBUMIN SERPL-MCNC: 4 G/DL (ref 3.4–5)
ALP SERPL-CCNC: 55 U/L (ref 40–150)
ALT SERPL W P-5'-P-CCNC: 18 U/L (ref 0–50)
ANION GAP SERPL CALCULATED.3IONS-SCNC: 8 MMOL/L (ref 3–14)
AST SERPL W P-5'-P-CCNC: 11 U/L (ref 0–45)
BASOPHILS # BLD AUTO: 0 10E3/UL (ref 0–0.2)
BASOPHILS NFR BLD AUTO: 0 %
BILIRUB SERPL-MCNC: 0.3 MG/DL (ref 0.2–1.3)
BUN SERPL-MCNC: 7 MG/DL (ref 7–30)
CALCIUM SERPL-MCNC: 9.5 MG/DL (ref 8.5–10.1)
CHLORIDE BLD-SCNC: 104 MMOL/L (ref 94–109)
CO2 SERPL-SCNC: 25 MMOL/L (ref 20–32)
CREAT SERPL-MCNC: 0.58 MG/DL (ref 0.52–1.04)
EOSINOPHIL # BLD AUTO: 0 10E3/UL (ref 0–0.7)
EOSINOPHIL NFR BLD AUTO: 1 %
ERYTHROCYTE [DISTWIDTH] IN BLOOD BY AUTOMATED COUNT: 12.3 % (ref 10–15)
GFR SERPL CREATININE-BSD FRML MDRD: >90 ML/MIN/1.73M2
GLUCOSE BLD-MCNC: 82 MG/DL (ref 70–99)
HCT VFR BLD AUTO: 39.8 % (ref 35–47)
HGB BLD-MCNC: 13.5 G/DL (ref 11.7–15.7)
HOLD SPECIMEN: NORMAL
IMM GRANULOCYTES # BLD: 0 10E3/UL
IMM GRANULOCYTES NFR BLD: 0 %
LIPASE SERPL-CCNC: 96 U/L (ref 73–393)
LYMPHOCYTES # BLD AUTO: 2 10E3/UL (ref 0.8–5.3)
LYMPHOCYTES NFR BLD AUTO: 23 %
MCH RBC QN AUTO: 30.8 PG (ref 26.5–33)
MCHC RBC AUTO-ENTMCNC: 33.9 G/DL (ref 31.5–36.5)
MCV RBC AUTO: 91 FL (ref 78–100)
MONOCYTES # BLD AUTO: 0.6 10E3/UL (ref 0–1.3)
MONOCYTES NFR BLD AUTO: 6 %
NEUTROPHILS # BLD AUTO: 5.9 10E3/UL (ref 1.6–8.3)
NEUTROPHILS NFR BLD AUTO: 70 %
NRBC # BLD AUTO: 0 10E3/UL
NRBC BLD AUTO-RTO: 0 /100
PLATELET # BLD AUTO: 232 10E3/UL (ref 150–450)
POTASSIUM BLD-SCNC: 3.5 MMOL/L (ref 3.4–5.3)
PROT SERPL-MCNC: 7.8 G/DL (ref 6.8–8.8)
RBC # BLD AUTO: 4.38 10E6/UL (ref 3.8–5.2)
SODIUM SERPL-SCNC: 137 MMOL/L (ref 133–144)
WBC # BLD AUTO: 8.6 10E3/UL (ref 4–11)

## 2022-08-21 PROCEDURE — 258N000003 HC RX IP 258 OP 636: Performed by: EMERGENCY MEDICINE

## 2022-08-21 PROCEDURE — 96361 HYDRATE IV INFUSION ADD-ON: CPT

## 2022-08-21 PROCEDURE — 36415 COLL VENOUS BLD VENIPUNCTURE: CPT | Performed by: EMERGENCY MEDICINE

## 2022-08-21 PROCEDURE — 82040 ASSAY OF SERUM ALBUMIN: CPT | Performed by: EMERGENCY MEDICINE

## 2022-08-21 PROCEDURE — 85025 COMPLETE CBC W/AUTO DIFF WBC: CPT | Performed by: EMERGENCY MEDICINE

## 2022-08-21 PROCEDURE — 96375 TX/PRO/DX INJ NEW DRUG ADDON: CPT

## 2022-08-21 PROCEDURE — 83690 ASSAY OF LIPASE: CPT | Performed by: EMERGENCY MEDICINE

## 2022-08-21 PROCEDURE — 99284 EMERGENCY DEPT VISIT MOD MDM: CPT | Mod: 25

## 2022-08-21 PROCEDURE — 96374 THER/PROPH/DIAG INJ IV PUSH: CPT

## 2022-08-21 PROCEDURE — 96376 TX/PRO/DX INJ SAME DRUG ADON: CPT

## 2022-08-21 PROCEDURE — 80053 COMPREHEN METABOLIC PANEL: CPT | Performed by: EMERGENCY MEDICINE

## 2022-08-21 PROCEDURE — 250N000011 HC RX IP 250 OP 636: Performed by: EMERGENCY MEDICINE

## 2022-08-21 RX ORDER — DIPHENHYDRAMINE HYDROCHLORIDE 50 MG/ML
25 INJECTION INTRAMUSCULAR; INTRAVENOUS ONCE
Status: COMPLETED | OUTPATIENT
Start: 2022-08-21 | End: 2022-08-21

## 2022-08-21 RX ORDER — ONDANSETRON 2 MG/ML
4 INJECTION INTRAMUSCULAR; INTRAVENOUS ONCE
Status: COMPLETED | OUTPATIENT
Start: 2022-08-21 | End: 2022-08-21

## 2022-08-21 RX ADMIN — DEXTROSE AND SODIUM CHLORIDE 1000 ML: 5; 900 INJECTION, SOLUTION INTRAVENOUS at 20:53

## 2022-08-21 RX ADMIN — DIPHENHYDRAMINE HYDROCHLORIDE 25 MG: 50 INJECTION, SOLUTION INTRAMUSCULAR; INTRAVENOUS at 22:39

## 2022-08-21 RX ADMIN — ONDANSETRON 4 MG: 2 INJECTION INTRAMUSCULAR; INTRAVENOUS at 20:37

## 2022-08-21 RX ADMIN — ONDANSETRON 4 MG: 2 INJECTION INTRAMUSCULAR; INTRAVENOUS at 22:05

## 2022-08-21 RX ADMIN — SODIUM CHLORIDE 1000 ML: 9 INJECTION, SOLUTION INTRAVENOUS at 20:38

## 2022-08-21 RX ADMIN — PROCHLORPERAZINE EDISYLATE 5 MG: 5 INJECTION INTRAMUSCULAR; INTRAVENOUS at 22:39

## 2022-08-21 ASSESSMENT — ENCOUNTER SYMPTOMS
VOMITING: 1
ABDOMINAL PAIN: 0
DIARRHEA: 0
FEVER: 0
DYSURIA: 0
COUGH: 0
NAUSEA: 1

## 2022-08-21 ASSESSMENT — ACTIVITIES OF DAILY LIVING (ADL)
ADLS_ACUITY_SCORE: 35
ADLS_ACUITY_SCORE: 35

## 2022-08-22 NOTE — ED PROVIDER NOTES
History   Chief Complaint:  Nausea       HPI   Omar Chatterjee is a 27 year old female 8 weeks pregnant who presents with nausea and vomiting that started yesterday. She has been taking oral Zofran at home with relief Wednesday, Thursday and Friday and had an allergic reaction to Reglan. He reports of constipation and states she has not been using anything. She had a miscarriage in May. Her OB is Dr. Forrester. Denies abdominal pain, diarrhea, fever, cough, hematemesis or dysuria.    Review of Systems   Constitutional: Negative for fever.   Respiratory: Negative for cough.    Gastrointestinal: Positive for nausea and vomiting. Negative for abdominal pain and diarrhea.   Genitourinary: Negative for dysuria.   All other systems reviewed and are negative.      Allergies:  Azithromycin  Erythromycin  Metoclopramide    Medications:  Epinephrine  Synthroid  Reglan  Zofran  Prenatal     Past Medical History:     Hashimoto's thyroiditis  Thyroid nodule  Asthma  Vitamin D deficiency    Past Surgical History:    [place] teeth extraction     Family History:    Mother - hypertension    Social History:  The patient presents to the ED with male .  PCP: [place], [facility]       Physical Exam     Patient Vitals for the past 24 hrs:   BP Temp Temp src Pulse Resp SpO2   08/21/22 2000 111/77 -- -- -- -- 99 %   08/21/22 1859 135/85 97.9  F (36.6  C) Temporal 87 18 99 %       Physical Exam    Nursing note and vitals reviewed.  Constitutional:  Appears well-developed and well-nourished.   HENT:   Head:    Atraumatic.   Mouth/Throat:   Oropharynx is clear and moist. No oropharyngeal exudate.   Eyes:    Pupils are equal, round, and reactive to light.   Neck:    Normal range of motion. Neck supple.      No tracheal deviation present. No thyromegaly present.   Cardiovascular:  Normal rate, regular rhythm, no murmur   Pulmonary/Chest: Breath sounds are clear and equal without wheezes or crackles.  Abdominal:   Soft. Bowel  sounds are normal. Exhibits no distension and      no mass. There is no tenderness.      There is no rebound and no guarding.   Musculoskeletal:  Exhibits no edema.   Lymphadenopathy:  No cervical adenopathy.   Neurological:   Alert and oriented to person, place, and time.   Skin:    Skin is warm and dry. No rash noted. No pallor.       Emergency Department Course     Laboratory:  Labs Ordered and Resulted from Time of ED Arrival to Time of ED Departure   COMPREHENSIVE METABOLIC PANEL - Normal       Result Value    Sodium 137      Potassium 3.5      Chloride 104      Carbon Dioxide (CO2) 25      Anion Gap 8      Urea Nitrogen 7      Creatinine 0.58      Calcium 9.5      Glucose 82      Alkaline Phosphatase 55      AST 11      ALT 18      Protein Total 7.8      Albumin 4.0      Bilirubin Total 0.3      GFR Estimate >90     LIPASE - Normal    Lipase 96     CBC WITH PLATELETS AND DIFFERENTIAL    WBC Count 8.6      RBC Count 4.38      Hemoglobin 13.5      Hematocrit 39.8      MCV 91      MCH 30.8      MCHC 33.9      RDW 12.3      Platelet Count 232      % Neutrophils 70      % Lymphocytes 23      % Monocytes 6      % Eosinophils 1      % Basophils 0      % Immature Granulocytes 0      NRBCs per 100 WBC 0      Absolute Neutrophils 5.9      Absolute Lymphocytes 2.0      Absolute Monocytes 0.6      Absolute Eosinophils 0.0      Absolute Basophils 0.0      Absolute Immature Granulocytes 0.0      Absolute NRBCs 0.0            Emergency Department Course:        Reviewed:  I reviewed nursing notes, vitals, past medical history and Care Everywhere    Assessments:  2010 I obtained history and examined the patient as noted above.   2201 I rechecked the patient and explained findings. On recheck she was still vomiting.     Interventions:  Medications   0.9% sodium chloride BOLUS (0 mLs Intravenous Stopped 8/21/22 2208)   ondansetron (ZOFRAN) injection 4 mg (4 mg Intravenous Given 8/21/22 2037)   dextrose 5% and 0.9% NaCl BOLUS (0  mLs Intravenous Stopped 8/21/22 2208)   ondansetron (ZOFRAN) injection 4 mg (4 mg Intravenous Given 8/21/22 2205)   prochlorperazine (COMPAZINE) injection 5 mg (5 mg Intravenous Given 8/21/22 2239)   diphenhydrAMINE (BENADRYL) injection 25 mg (25 mg Intravenous Given 8/21/22 2239)         Disposition:  The patient was discharged to home.     Impression & Plan     Medical Decision Making:  Omar Chatterjee is a 27 year old female who presents for evaluation of nausea and vomiting. She is currently pregnant. Nonetheless, I considered a broad differential diagnosis for this patient including viral gastroenteritis, food poisoning, bowel obstruction, intra-abdominal infection such as colitis, cholecystitis, UTI, pyelonephritis, appendicitis, etc. There are no signs of worrisome intra-abdominal pathologies detected during the visit today. The patient has a completely benign abdominal exam without rebound, guarding, or marked tenderness to palpation.   Supportive outpatient management is therefore indicated. Abdominal pain precautions are given for home. It was discussed with the patient to return to the ED for blood in stool, increasing pain, or fevers more than 102. Feels much improved after interventions in the ED. See above for medications for home. I believe all of her symptoms are at this point explained by the pregnancy and hyperemesis gravidarum.  She was feeling better after Compazine and Benadryl, and felt comfortable with discharge.  The plan is that she call her OB doctor in the morning for medication recommendations.    Diagnosis:    ICD-10-CM    1. Hyperemesis gravidarum  O21.0        Discharge Medications:  New Prescriptions    No medications on file       Scribe Disclosure:  I, Leonila Sanches, am serving as a scribe at 7:31 PM on 8/21/2022 to document services personally performed by Liz Gordillo MD based on my observations and the provider's statements to me.              Liz Gordillo MD  08/21/22  2315       Liz Gordillo MD  08/21/22 1565

## 2022-08-22 NOTE — ED TRIAGE NOTES
Pt is currently 8 weeks pregnant, has been taking zofran for nausea. Pt reports being unable to keep any fluids down today and yesterday. Last took zofran at 1630, threw up right after.      Triage Assessment     Row Name 08/21/22 1900       Triage Assessment (Adult)    Airway WDL WDL       Respiratory WDL    Respiratory WDL WDL       Skin Circulation/Temperature WDL    Skin Circulation/Temperature WDL WDL       Cardiac WDL    Cardiac WDL WDL       Peripheral/Neurovascular WDL    Peripheral Neurovascular WDL WDL       Cognitive/Neuro/Behavioral WDL    Cognitive/Neuro/Behavioral WDL WDL

## 2022-08-23 ENCOUNTER — PRE VISIT (OUTPATIENT)
Dept: OTOLARYNGOLOGY | Facility: CLINIC | Age: 27
End: 2022-08-23

## 2022-10-26 ENCOUNTER — TRANSFERRED RECORDS (OUTPATIENT)
Dept: HEALTH INFORMATION MANAGEMENT | Facility: CLINIC | Age: 27
End: 2022-10-26

## 2022-11-11 ENCOUNTER — LAB REQUISITION (OUTPATIENT)
Dept: LAB | Facility: CLINIC | Age: 27
End: 2022-11-11
Payer: COMMERCIAL

## 2022-11-11 DIAGNOSIS — R30.0 DYSURIA: ICD-10-CM

## 2022-11-11 PROCEDURE — 87086 URINE CULTURE/COLONY COUNT: CPT | Mod: ORL | Performed by: NURSE PRACTITIONER

## 2022-11-13 LAB — BACTERIA UR CULT: NO GROWTH

## 2022-11-20 ENCOUNTER — HEALTH MAINTENANCE LETTER (OUTPATIENT)
Age: 27
End: 2022-11-20

## 2023-01-13 ENCOUNTER — LAB REQUISITION (OUTPATIENT)
Dept: LAB | Facility: CLINIC | Age: 28
End: 2023-01-13

## 2023-01-13 DIAGNOSIS — E03.9 HYPOTHYROIDISM, UNSPECIFIED: ICD-10-CM

## 2023-01-13 DIAGNOSIS — Z36.89 ENCOUNTER FOR OTHER SPECIFIED ANTENATAL SCREENING: ICD-10-CM

## 2023-01-13 LAB
GLUCOSE 1H P 50 G GLC PO SERPL-MCNC: 129 MG/DL (ref 70–129)
HEPATITIS B SURFACE ANTIGEN (EXTERNAL): NEGATIVE
HGB BLD-MCNC: 11.7 G/DL (ref 11.7–15.7)
RUBELLA ANTIBODY IGG (EXTERNAL): NORMAL
TREPONEMA PALLIDUM ANTIBODY (EXTERNAL): NONREACTIVE
TSH SERPL DL<=0.005 MIU/L-ACNC: 1.38 UIU/ML (ref 0.3–4.2)

## 2023-01-13 PROCEDURE — 84443 ASSAY THYROID STIM HORMONE: CPT | Performed by: OBSTETRICS & GYNECOLOGY

## 2023-01-13 PROCEDURE — 82950 GLUCOSE TEST: CPT | Performed by: OBSTETRICS & GYNECOLOGY

## 2023-01-13 PROCEDURE — 85018 HEMOGLOBIN: CPT | Performed by: OBSTETRICS & GYNECOLOGY

## 2023-01-13 PROCEDURE — 86592 SYPHILIS TEST NON-TREP QUAL: CPT | Performed by: OBSTETRICS & GYNECOLOGY

## 2023-01-16 LAB — RPR SER QL: NONREACTIVE

## 2023-03-03 ENCOUNTER — LAB REQUISITION (OUTPATIENT)
Dept: LAB | Facility: CLINIC | Age: 28
End: 2023-03-03
Payer: COMMERCIAL

## 2023-03-03 DIAGNOSIS — Z3A.36 36 WEEKS GESTATION OF PREGNANCY: ICD-10-CM

## 2023-03-03 LAB — GROUP B STREPTOCOCCUS (EXTERNAL): POSITIVE

## 2023-03-03 PROCEDURE — 87653 STREP B DNA AMP PROBE: CPT | Mod: ORL | Performed by: OBSTETRICS & GYNECOLOGY

## 2023-03-04 LAB — GP B STREP DNA SPEC QL NAA+PROBE: POSITIVE

## 2023-03-13 ENCOUNTER — ANESTHESIA (OUTPATIENT)
Dept: OBGYN | Facility: CLINIC | Age: 28
End: 2023-03-13
Payer: COMMERCIAL

## 2023-03-13 ENCOUNTER — HOSPITAL ENCOUNTER (INPATIENT)
Facility: CLINIC | Age: 28
LOS: 2 days | Discharge: HOME HEALTH | End: 2023-03-15
Attending: OBSTETRICS & GYNECOLOGY | Admitting: OBSTETRICS & GYNECOLOGY
Payer: COMMERCIAL

## 2023-03-13 ENCOUNTER — ANESTHESIA EVENT (OUTPATIENT)
Dept: OBGYN | Facility: CLINIC | Age: 28
End: 2023-03-13
Payer: COMMERCIAL

## 2023-03-13 DIAGNOSIS — T83.511A URINARY TRACT INFECTION ASSOCIATED WITH CATHETERIZATION OF URINARY TRACT, UNSPECIFIED INDWELLING URINARY CATHETER TYPE, INITIAL ENCOUNTER (H): ICD-10-CM

## 2023-03-13 DIAGNOSIS — N39.0 URINARY TRACT INFECTION ASSOCIATED WITH CATHETERIZATION OF URINARY TRACT, UNSPECIFIED INDWELLING URINARY CATHETER TYPE, INITIAL ENCOUNTER (H): ICD-10-CM

## 2023-03-13 DIAGNOSIS — D50.8 OTHER IRON DEFICIENCY ANEMIA: ICD-10-CM

## 2023-03-13 LAB
ABO/RH(D): NORMAL
ANTIBODY SCREEN: NEGATIVE
ERYTHROCYTE [DISTWIDTH] IN BLOOD BY AUTOMATED COUNT: 13.5 % (ref 10–15)
HCT VFR BLD AUTO: 33.6 % (ref 35–47)
HGB BLD-MCNC: 10.8 G/DL (ref 11.7–15.7)
MCH RBC QN AUTO: 27.9 PG (ref 26.5–33)
MCHC RBC AUTO-ENTMCNC: 32.1 G/DL (ref 31.5–36.5)
MCV RBC AUTO: 87 FL (ref 78–100)
PLATELET # BLD AUTO: 204 10E3/UL (ref 150–450)
RBC # BLD AUTO: 3.87 10E6/UL (ref 3.8–5.2)
SPECIMEN EXPIRATION DATE: NORMAL
T PALLIDUM AB SER QL: NONREACTIVE
WBC # BLD AUTO: 10.3 10E3/UL (ref 4–11)

## 2023-03-13 PROCEDURE — 120N000012 HC R&B POSTPARTUM

## 2023-03-13 PROCEDURE — 250N000011 HC RX IP 250 OP 636: Performed by: ANESTHESIOLOGY

## 2023-03-13 PROCEDURE — 36415 COLL VENOUS BLD VENIPUNCTURE: CPT | Performed by: OBSTETRICS & GYNECOLOGY

## 2023-03-13 PROCEDURE — 370N000003 HC ANESTHESIA WARD SERVICE

## 2023-03-13 PROCEDURE — 250N000009 HC RX 250: Performed by: OBSTETRICS & GYNECOLOGY

## 2023-03-13 PROCEDURE — 250N000013 HC RX MED GY IP 250 OP 250 PS 637: Performed by: OBSTETRICS & GYNECOLOGY

## 2023-03-13 PROCEDURE — 258N000003 HC RX IP 258 OP 636: Performed by: OBSTETRICS & GYNECOLOGY

## 2023-03-13 PROCEDURE — 250N000011 HC RX IP 250 OP 636: Performed by: OBSTETRICS & GYNECOLOGY

## 2023-03-13 PROCEDURE — 85027 COMPLETE CBC AUTOMATED: CPT | Performed by: OBSTETRICS & GYNECOLOGY

## 2023-03-13 PROCEDURE — G0463 HOSPITAL OUTPT CLINIC VISIT: HCPCS

## 2023-03-13 PROCEDURE — 86780 TREPONEMA PALLIDUM: CPT | Performed by: OBSTETRICS & GYNECOLOGY

## 2023-03-13 PROCEDURE — 86850 RBC ANTIBODY SCREEN: CPT | Performed by: OBSTETRICS & GYNECOLOGY

## 2023-03-13 PROCEDURE — 3E0R3BZ INTRODUCTION OF ANESTHETIC AGENT INTO SPINAL CANAL, PERCUTANEOUS APPROACH: ICD-10-PCS | Performed by: ANESTHESIOLOGY

## 2023-03-13 PROCEDURE — 00HU33Z INSERTION OF INFUSION DEVICE INTO SPINAL CANAL, PERCUTANEOUS APPROACH: ICD-10-PCS | Performed by: ANESTHESIOLOGY

## 2023-03-13 PROCEDURE — 120N000001 HC R&B MED SURG/OB

## 2023-03-13 PROCEDURE — 86901 BLOOD TYPING SEROLOGIC RH(D): CPT | Performed by: OBSTETRICS & GYNECOLOGY

## 2023-03-13 PROCEDURE — 722N000001 HC LABOR CARE VAGINAL DELIVERY SINGLE

## 2023-03-13 RX ORDER — NALOXONE HYDROCHLORIDE 0.4 MG/ML
0.4 INJECTION, SOLUTION INTRAMUSCULAR; INTRAVENOUS; SUBCUTANEOUS
Status: DISCONTINUED | OUTPATIENT
Start: 2023-03-13 | End: 2023-03-13

## 2023-03-13 RX ORDER — MISOPROSTOL 200 UG/1
800 TABLET ORAL
Status: DISCONTINUED | OUTPATIENT
Start: 2023-03-13 | End: 2023-03-15 | Stop reason: HOSPADM

## 2023-03-13 RX ORDER — CARBOPROST TROMETHAMINE 250 UG/ML
250 INJECTION, SOLUTION INTRAMUSCULAR
Status: DISCONTINUED | OUTPATIENT
Start: 2023-03-13 | End: 2023-03-15 | Stop reason: HOSPADM

## 2023-03-13 RX ORDER — MISOPROSTOL 200 UG/1
400 TABLET ORAL
Status: DISCONTINUED | OUTPATIENT
Start: 2023-03-13 | End: 2023-03-15 | Stop reason: HOSPADM

## 2023-03-13 RX ORDER — ACETAMINOPHEN 325 MG/1
650 TABLET ORAL EVERY 4 HOURS PRN
Status: DISCONTINUED | OUTPATIENT
Start: 2023-03-13 | End: 2023-03-15 | Stop reason: HOSPADM

## 2023-03-13 RX ORDER — PROCHLORPERAZINE MALEATE 5 MG
10 TABLET ORAL EVERY 6 HOURS PRN
Status: DISCONTINUED | OUTPATIENT
Start: 2023-03-13 | End: 2023-03-13 | Stop reason: HOSPADM

## 2023-03-13 RX ORDER — PROCHLORPERAZINE 25 MG
25 SUPPOSITORY, RECTAL RECTAL EVERY 12 HOURS PRN
Status: DISCONTINUED | OUTPATIENT
Start: 2023-03-13 | End: 2023-03-13 | Stop reason: HOSPADM

## 2023-03-13 RX ORDER — MISOPROSTOL 200 UG/1
400 TABLET ORAL
Status: DISCONTINUED | OUTPATIENT
Start: 2023-03-13 | End: 2023-03-13

## 2023-03-13 RX ORDER — EPHEDRINE SULFATE 50 MG/ML
5 INJECTION, SOLUTION INTRAMUSCULAR; INTRAVENOUS; SUBCUTANEOUS
Status: DISCONTINUED | OUTPATIENT
Start: 2023-03-13 | End: 2023-03-13

## 2023-03-13 RX ORDER — OXYTOCIN 10 [USP'U]/ML
10 INJECTION, SOLUTION INTRAMUSCULAR; INTRAVENOUS
Status: DISCONTINUED | OUTPATIENT
Start: 2023-03-13 | End: 2023-03-13

## 2023-03-13 RX ORDER — ONDANSETRON 4 MG/1
4 TABLET, ORALLY DISINTEGRATING ORAL EVERY 6 HOURS PRN
Status: DISCONTINUED | OUTPATIENT
Start: 2023-03-13 | End: 2023-03-13 | Stop reason: HOSPADM

## 2023-03-13 RX ORDER — PENICILLIN G POTASSIUM 5000000 [IU]/1
5 INJECTION, POWDER, FOR SOLUTION INTRAMUSCULAR; INTRAVENOUS ONCE
Status: COMPLETED | OUTPATIENT
Start: 2023-03-13 | End: 2023-03-13

## 2023-03-13 RX ORDER — LEVOTHYROXINE SODIUM 25 UG/1
25 TABLET ORAL DAILY
Status: DISCONTINUED | OUTPATIENT
Start: 2023-03-14 | End: 2023-03-15 | Stop reason: HOSPADM

## 2023-03-13 RX ORDER — MODIFIED LANOLIN
OINTMENT (GRAM) TOPICAL
Status: DISCONTINUED | OUTPATIENT
Start: 2023-03-13 | End: 2023-03-15 | Stop reason: HOSPADM

## 2023-03-13 RX ORDER — PROCHLORPERAZINE 25 MG
25 SUPPOSITORY, RECTAL RECTAL EVERY 12 HOURS PRN
Status: DISCONTINUED | OUTPATIENT
Start: 2023-03-13 | End: 2023-03-13

## 2023-03-13 RX ORDER — IBUPROFEN 400 MG/1
800 TABLET, FILM COATED ORAL EVERY 6 HOURS PRN
Status: DISCONTINUED | OUTPATIENT
Start: 2023-03-13 | End: 2023-03-15 | Stop reason: HOSPADM

## 2023-03-13 RX ORDER — KETOROLAC TROMETHAMINE 30 MG/ML
30 INJECTION, SOLUTION INTRAMUSCULAR; INTRAVENOUS
Status: DISCONTINUED | OUTPATIENT
Start: 2023-03-13 | End: 2023-03-13

## 2023-03-13 RX ORDER — IBUPROFEN 400 MG/1
800 TABLET, FILM COATED ORAL
Status: DISCONTINUED | OUTPATIENT
Start: 2023-03-13 | End: 2023-03-13

## 2023-03-13 RX ORDER — ROPIVACAINE HYDROCHLORIDE 2 MG/ML
INJECTION, SOLUTION EPIDURAL; INFILTRATION; PERINEURAL
Status: COMPLETED | OUTPATIENT
Start: 2023-03-13 | End: 2023-03-13

## 2023-03-13 RX ORDER — OXYTOCIN 10 [USP'U]/ML
10 INJECTION, SOLUTION INTRAMUSCULAR; INTRAVENOUS
Status: DISCONTINUED | OUTPATIENT
Start: 2023-03-13 | End: 2023-03-15 | Stop reason: HOSPADM

## 2023-03-13 RX ORDER — CARBOPROST TROMETHAMINE 250 UG/ML
250 INJECTION, SOLUTION INTRAMUSCULAR
Status: DISCONTINUED | OUTPATIENT
Start: 2023-03-13 | End: 2023-03-13

## 2023-03-13 RX ORDER — ONDANSETRON 2 MG/ML
4 INJECTION INTRAMUSCULAR; INTRAVENOUS EVERY 6 HOURS PRN
Status: DISCONTINUED | OUTPATIENT
Start: 2023-03-13 | End: 2023-03-13 | Stop reason: HOSPADM

## 2023-03-13 RX ORDER — DOCUSATE SODIUM 100 MG/1
100 CAPSULE, LIQUID FILLED ORAL 2 TIMES DAILY
Status: DISCONTINUED | OUTPATIENT
Start: 2023-03-13 | End: 2023-03-15 | Stop reason: HOSPADM

## 2023-03-13 RX ORDER — LIDOCAINE 40 MG/G
CREAM TOPICAL
Status: DISCONTINUED | OUTPATIENT
Start: 2023-03-13 | End: 2023-03-13

## 2023-03-13 RX ORDER — SODIUM CHLORIDE, SODIUM LACTATE, POTASSIUM CHLORIDE, CALCIUM CHLORIDE 600; 310; 30; 20 MG/100ML; MG/100ML; MG/100ML; MG/100ML
INJECTION, SOLUTION INTRAVENOUS CONTINUOUS
Status: DISCONTINUED | OUTPATIENT
Start: 2023-03-13 | End: 2023-03-13

## 2023-03-13 RX ORDER — OXYTOCIN/0.9 % SODIUM CHLORIDE 30/500 ML
340 PLASTIC BAG, INJECTION (ML) INTRAVENOUS CONTINUOUS PRN
Status: DISCONTINUED | OUTPATIENT
Start: 2023-03-13 | End: 2023-03-15 | Stop reason: HOSPADM

## 2023-03-13 RX ORDER — NALBUPHINE HYDROCHLORIDE 20 MG/ML
2.5-5 INJECTION, SOLUTION INTRAMUSCULAR; INTRAVENOUS; SUBCUTANEOUS EVERY 6 HOURS PRN
Status: DISCONTINUED | OUTPATIENT
Start: 2023-03-13 | End: 2023-03-13

## 2023-03-13 RX ORDER — OXYTOCIN/0.9 % SODIUM CHLORIDE 30/500 ML
100-340 PLASTIC BAG, INJECTION (ML) INTRAVENOUS CONTINUOUS PRN
Status: DISCONTINUED | OUTPATIENT
Start: 2023-03-13 | End: 2023-03-13

## 2023-03-13 RX ORDER — FENTANYL CITRATE 50 UG/ML
100 INJECTION, SOLUTION INTRAMUSCULAR; INTRAVENOUS
Status: DISCONTINUED | OUTPATIENT
Start: 2023-03-13 | End: 2023-03-13

## 2023-03-13 RX ORDER — TRANEXAMIC ACID 10 MG/ML
1 INJECTION, SOLUTION INTRAVENOUS EVERY 30 MIN PRN
Status: DISCONTINUED | OUTPATIENT
Start: 2023-03-13 | End: 2023-03-13

## 2023-03-13 RX ORDER — CITRIC ACID/SODIUM CITRATE 334-500MG
30 SOLUTION, ORAL ORAL
Status: DISCONTINUED | OUTPATIENT
Start: 2023-03-13 | End: 2023-03-13

## 2023-03-13 RX ORDER — OXYTOCIN/0.9 % SODIUM CHLORIDE 30/500 ML
340 PLASTIC BAG, INJECTION (ML) INTRAVENOUS CONTINUOUS PRN
Status: DISCONTINUED | OUTPATIENT
Start: 2023-03-13 | End: 2023-03-13

## 2023-03-13 RX ORDER — ROPIVACAINE HYDROCHLORIDE 2 MG/ML
10 INJECTION, SOLUTION EPIDURAL; INFILTRATION; PERINEURAL ONCE
Status: DISCONTINUED | OUTPATIENT
Start: 2023-03-13 | End: 2023-03-13

## 2023-03-13 RX ORDER — PENICILLIN G 3000000 [IU]/50ML
3 INJECTION, SOLUTION INTRAVENOUS EVERY 4 HOURS
Status: DISCONTINUED | OUTPATIENT
Start: 2023-03-13 | End: 2023-03-13

## 2023-03-13 RX ORDER — TRANEXAMIC ACID 10 MG/ML
1 INJECTION, SOLUTION INTRAVENOUS EVERY 30 MIN PRN
Status: DISCONTINUED | OUTPATIENT
Start: 2023-03-13 | End: 2023-03-15 | Stop reason: HOSPADM

## 2023-03-13 RX ORDER — HYDROCORTISONE 25 MG/G
CREAM TOPICAL 3 TIMES DAILY PRN
Status: DISCONTINUED | OUTPATIENT
Start: 2023-03-13 | End: 2023-03-15 | Stop reason: HOSPADM

## 2023-03-13 RX ORDER — FERROUS SULFATE 325(65) MG
325 TABLET ORAL
Status: ON HOLD | COMMUNITY
End: 2023-03-15

## 2023-03-13 RX ORDER — NALOXONE HYDROCHLORIDE 0.4 MG/ML
0.2 INJECTION, SOLUTION INTRAMUSCULAR; INTRAVENOUS; SUBCUTANEOUS
Status: DISCONTINUED | OUTPATIENT
Start: 2023-03-13 | End: 2023-03-13

## 2023-03-13 RX ORDER — METHYLERGONOVINE MALEATE 0.2 MG/ML
200 INJECTION INTRAVENOUS
Status: DISCONTINUED | OUTPATIENT
Start: 2023-03-13 | End: 2023-03-15 | Stop reason: HOSPADM

## 2023-03-13 RX ORDER — MISOPROSTOL 200 UG/1
800 TABLET ORAL
Status: DISCONTINUED | OUTPATIENT
Start: 2023-03-13 | End: 2023-03-13

## 2023-03-13 RX ORDER — PROCHLORPERAZINE MALEATE 5 MG
10 TABLET ORAL EVERY 6 HOURS PRN
Status: DISCONTINUED | OUTPATIENT
Start: 2023-03-13 | End: 2023-03-13

## 2023-03-13 RX ORDER — METHYLERGONOVINE MALEATE 0.2 MG/ML
200 INJECTION INTRAVENOUS
Status: DISCONTINUED | OUTPATIENT
Start: 2023-03-13 | End: 2023-03-13

## 2023-03-13 RX ADMIN — PENICILLIN G 3 MILLION UNITS: 3000000 INJECTION, SOLUTION INTRAVENOUS at 12:20

## 2023-03-13 RX ADMIN — SODIUM CHLORIDE, POTASSIUM CHLORIDE, SODIUM LACTATE AND CALCIUM CHLORIDE: 600; 310; 30; 20 INJECTION, SOLUTION INTRAVENOUS at 08:30

## 2023-03-13 RX ADMIN — PENICILLIN G 3 MILLION UNITS: 3000000 INJECTION, SOLUTION INTRAVENOUS at 16:21

## 2023-03-13 RX ADMIN — Medication 12 ML/HR: at 11:52

## 2023-03-13 RX ADMIN — ROPIVACAINE HYDROCHLORIDE 10 ML: 2 INJECTION, SOLUTION EPIDURAL; INFILTRATION at 11:54

## 2023-03-13 RX ADMIN — ACETAMINOPHEN 650 MG: 325 TABLET ORAL at 22:46

## 2023-03-13 RX ADMIN — IBUPROFEN 800 MG: 400 TABLET ORAL at 22:46

## 2023-03-13 RX ADMIN — Medication 340 ML/HR: at 21:21

## 2023-03-13 RX ADMIN — PENICILLIN G POTASSIUM 5 MILLION UNITS: 5000000 POWDER, FOR SOLUTION INTRAMUSCULAR; INTRAPLEURAL; INTRATHECAL; INTRAVENOUS at 08:30

## 2023-03-13 ASSESSMENT — ACTIVITIES OF DAILY LIVING (ADL)
ADLS_ACUITY_SCORE: 20

## 2023-03-13 NOTE — ANESTHESIA PREPROCEDURE EVALUATION
Anesthesia Pre-Procedure Evaluation    Patient: Omar Chatterjee   MRN: [id number] : 1995        Procedure : * No procedures listed *          Past Medical History:   Diagnosis Date     Hashimoto's thyroiditis      NO ACTIVE PROBLEMS      Thyroid nodule       Past Surgical History:   Procedure Laterality Date     ORTHOPEDIC SURGERY      excision of cervical intervertebral disc      Allergies   Allergen Reactions     Azithromycin Shortness Of Breath and Other (See Comments)     This is from childhood, has taken it since without problem  Having breathing problems       Erythromycin      Stop Breathing, per pt. As a child from ear drops, has taken it since with no problems      Metoclopramide Other (See Comments)      Social History     Tobacco Use     Smoking status: Never     Smokeless tobacco: Never   Substance Use Topics     Alcohol use: No      Wt Readings from Last 1 Encounters:   22 74.4 kg (164 lb)        Anesthesia Evaluation            ROS/MED HX  ENT/Pulmonary:    (-) asthma   Neurologic:  - neg neurologic ROS     Cardiovascular:    (-) PIH   METS/Exercise Tolerance:     Hematologic:     (+) no anticoagulation therapy, no coagulopathy,     Musculoskeletal:       GI/Hepatic:     (+) GERD,     Renal/Genitourinary:       Endo:     (+) thyroid problem,     Psychiatric/Substance Use:       Infectious Disease:       Malignancy:       Other:            Physical Exam    Airway        Mallampati: II   TM distance: > 3 FB   Neck ROM: full     Respiratory Devices and Support         Dental  no notable dental history         Cardiovascular   cardiovascular exam normal          Pulmonary   pulmonary exam normal                OUTSIDE LABS:  CBC:   Lab Results   Component Value Date    WBC 10.3 2023    WBC 8.6 2022    HGB 10.8 (L) 2023    HGB 11.7 2023    HCT 33.6 (L) 2023    HCT 39.8 2022     2023     2022     BMP:   Lab Results   Component Value  Date     08/21/2022     08/16/2022    POTASSIUM 3.5 08/21/2022    POTASSIUM 3.3 (L) 08/16/2022    CHLORIDE 104 08/21/2022    CHLORIDE 106 08/16/2022    CO2 25 08/21/2022    CO2 24 08/16/2022    BUN 7 08/21/2022    BUN 6 (L) 08/16/2022    CR 0.58 08/21/2022    CR 0.50 (L) 08/16/2022    GLC 82 08/21/2022     (H) 08/16/2022     COAGS: No results found for: PTT, INR, FIBR  POC:   Lab Results   Component Value Date    HCG Negative 03/25/2022    HCGS Negative 01/04/2022     HEPATIC:   Lab Results   Component Value Date    ALBUMIN 4.0 08/21/2022    PROTTOTAL 7.8 08/21/2022    ALT 18 08/21/2022    AST 11 08/21/2022    ALKPHOS 55 08/21/2022    BILITOTAL 0.3 08/21/2022     OTHER:   Lab Results   Component Value Date    KENYA 9.5 08/21/2022    LIPASE 96 08/21/2022    TSH 1.38 01/13/2023    T4 1.24 08/10/2022    SED 8 03/09/2014       Anesthesia Plan    ASA Status:  2      Anesthesia Type: Epidural.              Consents    Anesthesia Plan(s) and associated risks, benefits, and realistic alternatives discussed. Questions answered and patient/representative(s) expressed understanding.    - Discussed:     - Discussed with:  Patient         Postoperative Care            Comments:    Other Comments: Orders to manage the epidural infusion have been entered, and through coordination with the nurse, we will continute to manage and monitor the patient's labor epidural.  We will continuously be available to adjust as needed thruout the entire L&D process.             Bhupendra Lundy MD

## 2023-03-13 NOTE — CARE PLAN
Data: Patient admitted to room 218 at 0718. Patient is a . Prenatal record reviewed.   OB History    Para Term  AB Living   1 0 0 0 0 0   SAB IAB Ectopic Multiple Live Births   0 0 0 0 0      # Outcome Date GA Lbr Ambrose/2nd Weight Sex Delivery Anes PTL Lv   1 Current            .  Medical History:   Past Medical History:   Diagnosis Date     Hashimoto's thyroiditis      NO ACTIVE PROBLEMS      Thyroid nodule    .  Gestational age 37w5d. Vital signs per doc flowsheet. Fetal movement present. Patient reports Rule out rupture of membranes   as reason for admission. Support persons Iglesia present.  Action: Report from GUMARO Galan obtained at 0715. Care of patient assumed at 0715. Verbal consent for EFM, external fetal monitors applied. Admission assessment completed. Patient and support persons educated on labor process. Patient instructed to report change in fetal movement, contractions, vaginal leaking of fluid or bleeding, abdominal pain, or any concerns related to the pregnancy to her nurse/physician. Patient oriented to room, call light in reach.   Response: Dr. Erwin informed of SROM. Plan per provider is to admit pt and wait for augmentation. Patient verbalized understanding of education and verbalized agreement with plan. Patient coping with labor and rating pain 1/10 upon admission, would like an epidural when she goes into labor.       23 1143   Provider Notification   Provider Name/Title Dooly   Method of Notification At Bedside   Request Evaluate in Person   Notification Reason Pain  (epidural)      23 1250   Vaginal Exam   Method sterile exam per RN   Cervical Dilation (cm) 4   Cervical Effacement 80-90%   Fetal Station -1      23 1254   Provider Notification   Provider Name/Title Mushtaq   Method of Notification Electronic Page   Request Evaluate in Person   Notification Reason SVE;Status Update  (comfortable with epidural, 4/80-90/-1, x1 late after epidural, otherwise  reasuring strip)        03/13/23 1417   Vaginal Exam   Method sterile exam per RN   Cervical Dilation (cm) 5-6   Cervical Effacement 90%   Fetal Station -1      03/13/23 1615   Vaginal Exam   Method sterile exam per RN   Cervical Dilation (cm) 7-8   Cervical Effacement 90%   Fetal Station 0      03/13/23 1831   Vaginal Exam   Method sterile exam per RN   Cervical Dilation (cm) 10   Cervical Effacement 100%   Fetal Station 0   Labor Care   Activity In Labor bedrest   Change of Maternal Position throne position   Comfort Interventions additional pillows provided;quiet environment promoted   Support At Bedside spouse   Trust Relationship/Rapport choices provided;care explained;questions answered   Provider Notification   Provider Name/Title Dr Riley   Method of Notification Phone   Request Evaluate - Remote   Notification Reason SVE;Status Update;Labor Status  (10/100/0, labor down x1hr then SVE to reevlauate station, notify MD YARITZA 5 min away for delivery.)     Handoff to Yesika NARAYANAN RN @ [phone]    Radu Tobar, RN

## 2023-03-13 NOTE — PLAN OF CARE
Omar is a  37+5 presenting with  Iglesia for evaluation of rupture of membranes. She reports moderate loss of clear fluid at 0416 this morning. She endorses +FM and denies vaginal bleeding. SVE 3/80/-2.      23 0710   Provider Notification   Provider Name/Title Dr Erwin   Method of Notification Phone   Request Evaluate - Remote   Notification Reason Patient Arrived;Labor Status;Uterine Activity;Pain;Status Update;SVE     Report to Dr Erwin to include pt presence, hx, SROM, SVE. Admission orders received. Oncoming MD will assess need for augmentation. Report to GUMARO Lovelace to assume care today.

## 2023-03-13 NOTE — H&P
OB Brief Admit H&P    No significant change in general health status based on examination of the patient, review of Nursing Admission Database and prenatal record.    Pt is a 27 year old  @ 37w5d who presented to L&D with SROM.    Patient's prenatal course has been complicated by   1.hashimoto- follows with endo  2. GBS +   3. Rubella Non immune   4. Hyperemesis- resolved     Prenatal Labs:    Blood type O+  Hep Bs Ag neg  HIV neg  RPR NR  Hep C antibody negative  Rubella non-immune  GCT passed   GBS positive     EFW: 7#    /79   Temp 97.3  F (36.3  C) (Tympanic)   Resp 20   LMP 2022   EFM:  Initially on admit 150 +accels, no decel, mod variability   [place]: occasional contractions   SVE 3/80/-2 per RN, exam deferred this am   Membranes:  SROM at 0416 am      On evaluation this am,  +accels, no decel, mod variability; ctx Q3-5 min and rating 5/10      Assessment:  27 year old  @ 37w5d admitted for SROM    Plan:  1. Admit to labor and delivery   2. Expectant management  3. Admit labs   4. GBS treatment per protocol    Maren Landin MD  3/13/2023  8:42 AM

## 2023-03-14 LAB — HGB BLD-MCNC: 9.5 G/DL (ref 11.7–15.7)

## 2023-03-14 PROCEDURE — 36415 COLL VENOUS BLD VENIPUNCTURE: CPT | Performed by: OBSTETRICS & GYNECOLOGY

## 2023-03-14 PROCEDURE — 120N000012 HC R&B POSTPARTUM

## 2023-03-14 PROCEDURE — 90707 MMR VACCINE SC: CPT | Performed by: OBSTETRICS & GYNECOLOGY

## 2023-03-14 PROCEDURE — 250N000011 HC RX IP 250 OP 636: Performed by: OBSTETRICS & GYNECOLOGY

## 2023-03-14 PROCEDURE — 250N000013 HC RX MED GY IP 250 OP 250 PS 637: Performed by: OBSTETRICS & GYNECOLOGY

## 2023-03-14 PROCEDURE — 90471 IMMUNIZATION ADMIN: CPT | Performed by: OBSTETRICS & GYNECOLOGY

## 2023-03-14 PROCEDURE — 85018 HEMOGLOBIN: CPT | Performed by: OBSTETRICS & GYNECOLOGY

## 2023-03-14 RX ADMIN — IBUPROFEN 800 MG: 400 TABLET ORAL at 19:45

## 2023-03-14 RX ADMIN — ACETAMINOPHEN 650 MG: 325 TABLET ORAL at 19:45

## 2023-03-14 RX ADMIN — ACETAMINOPHEN 650 MG: 325 TABLET ORAL at 06:20

## 2023-03-14 RX ADMIN — IBUPROFEN 800 MG: 400 TABLET ORAL at 06:20

## 2023-03-14 RX ADMIN — LEVOTHYROXINE SODIUM 25 MCG: 25 TABLET ORAL at 10:39

## 2023-03-14 RX ADMIN — Medication: at 21:41

## 2023-03-14 RX ADMIN — MEASLES, MUMPS, AND RUBELLA VIRUS VACCINE LIVE 0.5 ML: 1000; 12500; 1000 INJECTION, POWDER, LYOPHILIZED, FOR SUSPENSION SUBCUTANEOUS at 09:49

## 2023-03-14 RX ADMIN — ACETAMINOPHEN 650 MG: 325 TABLET ORAL at 13:11

## 2023-03-14 RX ADMIN — IBUPROFEN 800 MG: 400 TABLET ORAL at 13:11

## 2023-03-14 RX ADMIN — DOCUSATE SODIUM 100 MG: 100 CAPSULE, LIQUID FILLED ORAL at 08:45

## 2023-03-14 RX ADMIN — DOCUSATE SODIUM 100 MG: 100 CAPSULE, LIQUID FILLED ORAL at 19:45

## 2023-03-14 ASSESSMENT — ACTIVITIES OF DAILY LIVING (ADL)
ADLS_ACUITY_SCORE: 20

## 2023-03-14 NOTE — PROGRESS NOTES
OB Progress Note  3/13/2023  7:56 PM    S:  Pt with excellent pain control with an epidural.  No other complaints. Patient has labored down while complete for over one hour and is now preparing to begin pushing.      O:  BP 98/59   Temp 97.8  F (36.6  C) (Tympanic)   Resp 20   LMP 2022   SpO2 99%   EFM: baseline 140, accelerations are present, no decelerations, moderate variability; Category I  [place]:  Ctx q1-3 min  SVE:  10/100%/vertex at 0 station  Membranes: SROM at 0416 hrs this morning        A/P:  27 year old  @37w5d admitted with SROM and labor.  Now complete and after a period of laboring down, now starting to push. GBS positive with adequate PCN prophylaxis.  Satisfactory fetal status    1.  Routine cares  2.  Patient to start pushing  3.  Anticipate  tonight  4.  The plan of care was explained to the patient and her partner, who expressed understanding and agreement.     Lake Riley MD

## 2023-03-14 NOTE — PLAN OF CARE
Patient to Postpartum room, report from L&D RN. Patient and significant other oriented to room, lights, call light, and bassinet. Educated on bulb syringe and how often to feed infant. Questions encouraged and answered. Continue with current plan of care.

## 2023-03-14 NOTE — PLAN OF CARE
VSS, fundus firm with scant bleeding, and voiding spontaneously and without difficulty. Steady gait and up ad noe. Appears to be bonding well with infant. Continuing to work on breastfeeding with shield. Continue with current plan of care.

## 2023-03-14 NOTE — PLAN OF CARE
Vital signs stable, fundus firm, scant rubra flow. Patient is up ad noe, due to void, pain well controlled with medications given as prescribed. Encouraged to continue to ambulate as able and void frequently. Patient is bonding well with infant. Working on breastfeeding. Transferred to  Rm 410 in a wheelchair, with infant in arms, accompanied by spouse. Report given to DEBBY Vazquez RN.

## 2023-03-14 NOTE — L&D DELIVERY NOTE
Delivery Date: 2023    ANTEPARTUM DIAGNOSES:  Intrauterine pregnancy, 37 weeks and 5 days, spontaneous rupture of membranes and spontaneous labor, group B strep positive.    POSTPARTUM DIAGNOSES:  Status post normal spontaneous vaginal delivery, infant female, 6 pounds 14 ounces, Apgars 8 and 9, no episiotomy or repair, spontaneous placental passage intact, adequate group B strep prophylaxis.    PATIENT IDENTIFICATION:  Omar Chatterjee is a 27-year-old  1, para 0 at 37 weeks 5 days' gestation, who was admitted to Labor and Delivery at [facility] for evaluation of spontaneous rupture of membranes.  The patient was followed at our office since early pregnancy.  Her pregnancy was complicated by Hashimoto's thyroiditis, for which she is followed by endocrinology and treated with oral Synthroid.  The patient was also found to be rubella nonimmune.  She had hyperemesis during the early part of her pregnancy that resolved.  At 36 weeks' gestation, she was found to be a group B strep vaginal carrier.  Her blood type was O positive. Hepatitis B, HIV, RPR were all negative.  Hepatitis C was also negative.  Her Rubella titer showed immunity.  One-hour glucose screen was normal, and a group B strep rectovaginal PCR at term was positive.  The estimated fetal weight was 7 pounds.  The patient was in her normal state of good health when at 0416 hours on 2023, she experienced spontaneous rupture of membranes at home.  She contacted our office and was instructed to present to Labor and Delivery for evaluation.    HOSPITAL COURSE:  FIRST STAGE OF LABOR:  Omar was admitted by 0700 hours on 2023.  Her vital signs were normal and she was afebrile.  Fetal heart tones were 150 with moderate variability, accelerations were present, decelerations were absent.  It was noted that the patient had ruptured membranes at 0416 hours that morning.  The fluid was clear.  Her cervix on initial examination was  3 cm, 80%, vertex -2.  She was shaila every 3-5 minutes on admission.  The patient was observed and her contractions increased in severity and duration.  By 1157 hours on 03/13/2023, she did have an epidural anesthetic placed, which provided excellent relief.  By 1300 hours, the cervix was 4 cm, 80-90% effaced, vertex -1.  Fetal heart tones remained category 1.  The cervix then progressed to 5-6 cm by 1500 hours, 7-8 cm by 1700 hours and to complete cervical dilation at 1831 hours.  No intravenous oxytocin augmentation was necessary.  The patient's epidural provided excellent analgesia and when she was noted to be completely dilated, the baby was at 0 station.    SECOND STAGE OF LABOR:  The patient was found to be completely dilated and the vertex was at 0 station.  She was feeling no significant urge to push and for this reason, the baby was allowed to labor down.  The amniotic fluid remained clear and contractions were regular and of satisfactory quality.  After approximately 80 minutes of laboring down, maternal expulsive efforts were begun.  The patient pushed very effectively and gradually brought the vertex down in the pelvis.  I was notified of the patient's preparedness for delivery at approximately 2045 hours.  When I arrived in the delivery suite, the patient was pushing effectively and the vertex was on the pelvic floor.  Fetal heart tones remained satisfactory with some variable decelerations with pushing.  The patient pushed very effectively and after approximately 20 minutes of pushing, the baby came to a full crown.  No episiotomy was necessary.  At 2119 hours on 03/13/2023, the patient delivered a viable infant female, 6 pounds 14 ounces with Apgars of 8 and 9 at 1 and 5 minutes respectively.  The baby was in a right occiput anterior position.  There was a double nuchal cord that was loose and easily reduced on the perineum.  The remainder of the baby was delivered easily without shoulder  dystocia.  The baby was immediately placed on the mother's abdomen.  Delayed cord clamping was performed, and after approximately 90 seconds, the umbilical cord was clamped and ligated by the patient's partner.  No resuscitation was necessary and there was no evidence of congenital anomalies.    THIRD STAGE OF LABOR:  After a 5-minute third stage of labor, the placenta delivered spontaneously intact with a 3-vessel cord.  Examination of the perineum found no significant lacerations.  There was a superficial laceration near the clitoral barrios on the left side.  This was not bleeding and was therefore not repaired.  The perineum and posterior vaginal wall were completely intact.  The quantitative blood loss for the delivery was 110 mL. The mother and the baby remained in the birthing room in excellent condition after delivery.  The patient received 4 doses of intravenous penicillin G and she therefore had adequate group B strep prophylaxis.    DELIVERY SUMMARY:     1.  Intrauterine pregnancy at 37 weeks 5 days' gestation, group B strep positive.  2.  Spontaneous rupture of membranes and spontaneous labor.  3.  Adequate group B strep prophylaxis in labor.  4.  Epidural in labor with excellent analgesia.  5.  Status post normal spontaneous vaginal delivery, infant female, 6 pounds 14 ounces, Apgars 8 and 9, right occiput anterior position.  Nuchal cord x 2 reduced on the perineum.  6.  Left periclitoral laceration, not repaired.    7.  Spontaneous placental passage intact.  8.  Quantitative blood loss 110 mL.  9.  Excellent maternal and fetal status post delivery.    Lake Riley MD        D: 2023   T: 2023   MT: [facility]    Name:     IAN ROGEL  MRN:      [id number]        Account:       [id number]   :      1995           Delivery Date: 2023     Document: [id number]    cc:  Lake Riley MD

## 2023-03-14 NOTE — LACTATION NOTE
Initial visit with Mother and Father and baby girl.  Infant is about 14 hours old at time of visit.   Mother states breast feeding is going well and she latches on well.  Infant has been sleepy with feedings.    Mother and Father educated on normal  behavior, focusing on normal feeding patterns from birth to day 3 of life.  Mother is pumping.    Reviewed early milk volumes, hand expression, and how to know infant is getting enough by recording feedings and wet/dirty diapers.   Breastfeeding general information reviewed. Reviewed with Mother and Father the breastfeeding section in admission booklet and encouraged to record infant feedings, voids, and stools in the feeding log.      Infant due for a feeding at time of visit.  LC reviewed with Mother proper positioning of infant, maternal hand placement, using breast feeding support pillows, and how to help infant achieve a deep latch with feedings.  Reviewed importance of getting a deep latch with feedings versus a shallow latch.  LC assisted mother to get infant latched onto right breast in the football position.  Good latch noted with strong, continuous suckle pattern.  Infant tolerates feeding well.     Encouraged rooming in, skin to skin, feeding on demand 8-12x/day or sooner if baby cues.    Encouraged Mother to call for assistance with latch or positioning if needed.  Appreciative of visit.  No further questions at this time. Will follow as needed.     Isabella Gómez RN, IBCLC

## 2023-03-14 NOTE — PLAN OF CARE
Vital signs stable. Postpartum assessment WDL. Pain controlled with Tylenol and ibuprofen.  Patient voiding without difficulty. Breastfeeding on cue with minimal  assist. Using a shield sometimes.  Patient and infant bonding well. Will continue with current plan of care.  Plan to discharge tomorrow.

## 2023-03-14 NOTE — PROGRESS NOTES
[facility]   Post-Partum Progress Note        Interval History:   Doing well.  Pain is adequately controlled.  No fevers.  No history of foul-smelling vaginal discharge.  Good appetite.  Denies chest pain, shortness of breath, nausea or vomiting.  Vaginal bleeding is similar to a heavy menstrual flow.  Breastfeeding well.Voiding well           Medications:   All medications related to the patient's surgery have been reviewed          Physical Exam:   All vitals stable  Blood pressure 129/74, pulse 83, temperature 98.4  F (36.9  C), temperature source Axillary, resp. rate 15, last menstrual period 02/20/2022, SpO2 95 %  Uterine fundus is firm, non-tender and at the level of the umbilicus  Perineum intact, no repair was needed          Data:     Hemoglobin   Date Value Ref Range Status   03/13/2023 10.8 (L) 11.7 - 15.7 g/dL Final            Assessment and Plan:    Assessment:   Post-partum day #1  Normal spontaneous vaginal delivery  :     Doing well.  No excessive bleeding  Pain well-controlled.   Plan:   Ambulation encouraged  Pain control measures as needed  Anticipate discharge tomorrow     Claire Forrester

## 2023-03-15 VITALS
HEART RATE: 87 BPM | RESPIRATION RATE: 18 BRPM | TEMPERATURE: 97.7 F | SYSTOLIC BLOOD PRESSURE: 113 MMHG | DIASTOLIC BLOOD PRESSURE: 75 MMHG | OXYGEN SATURATION: 95 %

## 2023-03-15 LAB
ALBUMIN UR-MCNC: NEGATIVE MG/DL
APPEARANCE UR: CLEAR
BILIRUB UR QL STRIP: NEGATIVE
COLOR UR AUTO: ABNORMAL
GLUCOSE UR STRIP-MCNC: NEGATIVE MG/DL
HGB UR QL STRIP: ABNORMAL
KETONES UR STRIP-MCNC: NEGATIVE MG/DL
LEUKOCYTE ESTERASE UR QL STRIP: ABNORMAL
MUCOUS THREADS #/AREA URNS LPF: PRESENT /LPF
NITRATE UR QL: NEGATIVE
PH UR STRIP: 6 [PH] (ref 5–7)
RBC URINE: 53 /HPF
SP GR UR STRIP: 1.02 (ref 1–1.03)
SQUAMOUS EPITHELIAL: <1 /HPF
UROBILINOGEN UR STRIP-MCNC: NORMAL MG/DL
WBC URINE: 14 /HPF

## 2023-03-15 PROCEDURE — 81001 URINALYSIS AUTO W/SCOPE: CPT | Performed by: OBSTETRICS & GYNECOLOGY

## 2023-03-15 PROCEDURE — 250N000013 HC RX MED GY IP 250 OP 250 PS 637: Performed by: OBSTETRICS & GYNECOLOGY

## 2023-03-15 PROCEDURE — 87086 URINE CULTURE/COLONY COUNT: CPT | Performed by: OBSTETRICS & GYNECOLOGY

## 2023-03-15 RX ORDER — CEFDINIR 300 MG/1
300 CAPSULE ORAL EVERY 12 HOURS SCHEDULED
Status: DISCONTINUED | OUTPATIENT
Start: 2023-03-15 | End: 2023-03-15 | Stop reason: HOSPADM

## 2023-03-15 RX ORDER — IBUPROFEN 600 MG/1
600 TABLET, FILM COATED ORAL EVERY 6 HOURS PRN
Qty: 20 TABLET | Refills: 0 | Status: SHIPPED | OUTPATIENT
Start: 2023-03-15 | End: 2023-04-07

## 2023-03-15 RX ORDER — ACETAMINOPHEN 325 MG/1
650 TABLET ORAL EVERY 4 HOURS PRN
Qty: 40 TABLET | Refills: 0 | Status: SHIPPED | OUTPATIENT
Start: 2023-03-15 | End: 2023-10-12

## 2023-03-15 RX ORDER — FERROUS SULFATE 325(65) MG
325 TABLET ORAL
Qty: 30 TABLET | Refills: 1 | Status: SHIPPED | OUTPATIENT
Start: 2023-03-15 | End: 2023-10-12

## 2023-03-15 RX ORDER — SENNA AND DOCUSATE SODIUM 50; 8.6 MG/1; MG/1
1 TABLET, FILM COATED ORAL 2 TIMES DAILY PRN
Qty: 30 TABLET | Refills: 0 | Status: SHIPPED | OUTPATIENT
Start: 2023-03-15 | End: 2023-10-12

## 2023-03-15 RX ORDER — CEFDINIR 300 MG/1
300 CAPSULE ORAL EVERY 12 HOURS
Qty: 14 CAPSULE | Refills: 0 | Status: SHIPPED | OUTPATIENT
Start: 2023-03-15 | End: 2023-03-22

## 2023-03-15 RX ORDER — NITROFURANTOIN 25; 75 MG/1; MG/1
100 CAPSULE ORAL EVERY 12 HOURS SCHEDULED
Status: DISCONTINUED | OUTPATIENT
Start: 2023-03-15 | End: 2023-03-15

## 2023-03-15 RX ORDER — NITROFURANTOIN 25; 75 MG/1; MG/1
100 CAPSULE ORAL EVERY 12 HOURS
Qty: 10 CAPSULE | Refills: 0 | Status: SHIPPED | OUTPATIENT
Start: 2023-03-15 | End: 2023-03-15

## 2023-03-15 RX ADMIN — CEFDINIR 300 MG: 300 CAPSULE ORAL at 10:46

## 2023-03-15 RX ADMIN — IBUPROFEN 800 MG: 400 TABLET ORAL at 05:21

## 2023-03-15 RX ADMIN — IBUPROFEN 800 MG: 400 TABLET ORAL at 11:22

## 2023-03-15 RX ADMIN — ACETAMINOPHEN 650 MG: 325 TABLET ORAL at 05:20

## 2023-03-15 RX ADMIN — DOCUSATE SODIUM 100 MG: 100 CAPSULE, LIQUID FILLED ORAL at 07:29

## 2023-03-15 RX ADMIN — ACETAMINOPHEN 650 MG: 325 TABLET ORAL at 11:22

## 2023-03-15 RX ADMIN — LEVOTHYROXINE SODIUM 25 MCG: 25 TABLET ORAL at 07:29

## 2023-03-15 ASSESSMENT — ACTIVITIES OF DAILY LIVING (ADL)
ADLS_ACUITY_SCORE: 20

## 2023-03-15 NOTE — ANESTHESIA POSTPROCEDURE EVALUATION
Patient: Omar Chatterjee    Procedure: * No procedures listed *       Anesthesia Type:  Epidural    Note:  Disposition: Inpatient   Postop Pain Control: Uneventful            Sign Out: Well controlled pain   PONV: No   Neuro/Psych: Uneventful            Sign Out: Acceptable/Baseline neuro status   Airway/Respiratory: Uneventful            Sign Out: Acceptable/Baseline resp. status   CV/Hemodynamics: Uneventful            Sign Out: Acceptable CV status; No obvious hypovolemia; No obvious fluid overload   Other NRE: NONE   DID A NON-ROUTINE EVENT OCCUR? No    Event details/Postop Comments:  Pt doing well.  Denies epidural-related complaints.           Last vitals:  Vitals:    03/14/23 1311 03/14/23 1953 03/14/23 2304   BP: 111/67 112/71 109/66   Pulse:  108 89   Resp: 16 16 16   Temp: 36.7  C (98  F) 37.1  C (98.8  F) 36.9  C (98.4  F)   SpO2:          Electronically Signed By: Chris Clark MD  March 14, 2023  11:08 PM

## 2023-03-15 NOTE — PLAN OF CARE
Vital signs stable. Postpartum assessment WDL. Complaining of 3-4/10 bottom pain tolerable with tylenol and ibuprofen. Patient also complaining of feeling like has uti md was paged afebrile. Patient voiding without difficulty. Breastfeeding and pumping. Patient and infant bonding well. Will continue with current plan of care.

## 2023-03-15 NOTE — PROGRESS NOTES
Post-partum Note      S: Patient is doing well today.  Pain is controlled with PO medications.  Tolerating regular diet without nausea or vomiting.  Ambulating without dizziness.  Urinating without difficulty. Lochia normal.  Breastfeeding. Notes pressure near her rectum and worried about having a BM. New burning with urination.    O:   Patient Vitals for the past 24 hrs:   BP Temp Temp src Pulse Resp   03/15/23 0816 113/75 97.7  F (36.5  C) Oral 87 18   23 2304 109/66 98.4  F (36.9  C) Oral 89 16   23 1953 112/71 98.8  F (37.1  C) Oral 108 16   23 1311 111/67 98  F (36.7  C) Oral -- 16     Gen:  Resting comfortably, NAD  Pulm:  \Breathing comfortably on room air  Abd:  Soft, appropriately ttp, non-distended.Fundus below umbilicus, firm and non-tender.  :  Perineum intact. No hemorrhoid noted.   Ext:  non-tender, 1+ bilateral LE edema    Hgb:   Hemoglobin   Date Value Ref Range Status   2023 9.5 (L) 11.7 - 15.7 g/dL Final   2015 12.5 11.7 - 15.7 g/dL Final     UA:   moderate blood, small LE, WBC 14    Assessment/Plan:  27 year old  on PPD #2 s/p .  1. Continue with routine postpartum management  2. Acute blood loss anemia on top of iron deficiency anemia of pregnancy. Continue iron supplement.   3. Dysuria: UA borderline, difficult to interpret in setting of lochia/postpartum. Will start macrobid and await urine culture.   4. Rubella non-immune, s/p MMR.   5. Feed: Breastfeeding  Dispo: DC home today. Warning signs reviewed. Follow-up in 6 weeks.    Alyx Rico MD  [facility]  3/15/2023, 8:29 AM

## 2023-03-15 NOTE — PLAN OF CARE
D: VSS, assessments WDL.   I: Pt. received complete discharge paperwork and home medications as filled by discharge pharmacy given  Pt. was given times of last dose for all discharge medications in writing on discharge medication sheets.  Discharge teaching included home medication, pain management, activity restrictions, postpartum cares, and signs and symptoms of infection.    A: Discharge outcomes on care plan met.  Mother states understanding and comfort with self care and follow up care.   P: Pt. Discharged.  Pt. was accompanied by  ju  and left with personal belongings.   Pt. to follow up with OB provider per discharge instructions.  Pt. had no further questions at the time of discharge and no unmet needs were identified.

## 2023-03-15 NOTE — LACTATION NOTE
This note was copied from a baby's chart.  Routine and discharge visit.  Mother states breast feeding is going really well and improving.  States baby girl is latching well and sucking for an extended period of time.  Discussed with Mother and Father pumping, bottle feeding, amount to supplement infant, pacifier use, and tips to help infant sleep in the bassinet.     Asking appropriate questions.   No further questions at this time. Reviewed follow up with outpatient lactation consultant in pediatrician clinic.    Isabella Gómez RN, IBCLC

## 2023-03-15 NOTE — PLAN OF CARE
Vital signs stable. Postpartum assessment WDL. Pain controlled with Tylenol and Ibuprofen. . Patient voiding without difficulty. Breastfeeding on cue with no  assist but latches checked. Patient and infant bonding well. Will continue with current plan of care.  Plan to discharge today.

## 2023-03-15 NOTE — PROVIDER NOTIFICATION
03/15/23 0612   Provider Notification   Provider Name/Title Elina   Method of Notification Phone   Request Evaluate-Remote   Notification Reason Status Update     Patient reported having new burning with urination in the night. Patient reported it feeling more like a uti then being related to her tear. MD Pittman ordered a ua with reflex to culture. Pt updated. Will monitor.

## 2023-03-17 LAB — BACTERIA UR CULT: NORMAL

## 2023-03-23 ENCOUNTER — HOSPITAL ENCOUNTER (OUTPATIENT)
Facility: CLINIC | Age: 28
End: 2023-03-23
Attending: OBSTETRICS & GYNECOLOGY | Admitting: OBSTETRICS & GYNECOLOGY
Payer: COMMERCIAL

## 2023-04-07 ENCOUNTER — OFFICE VISIT (OUTPATIENT)
Dept: FAMILY MEDICINE | Facility: CLINIC | Age: 28
End: 2023-04-07
Payer: COMMERCIAL

## 2023-04-07 VITALS
RESPIRATION RATE: 16 BRPM | HEART RATE: 77 BPM | OXYGEN SATURATION: 100 % | DIASTOLIC BLOOD PRESSURE: 85 MMHG | TEMPERATURE: 97.8 F | SYSTOLIC BLOOD PRESSURE: 123 MMHG

## 2023-04-07 DIAGNOSIS — E06.3 HASHIMOTO'S THYROIDITIS: ICD-10-CM

## 2023-04-07 DIAGNOSIS — Z01.818 PREOPERATIVE EXAMINATION: Primary | ICD-10-CM

## 2023-04-07 DIAGNOSIS — Z98.890 HISTORY OF NECK SURGERY: ICD-10-CM

## 2023-04-07 DIAGNOSIS — L90.5 SCAR OF NECK: ICD-10-CM

## 2023-04-07 DIAGNOSIS — D64.9 ANEMIA, UNSPECIFIED TYPE: ICD-10-CM

## 2023-04-07 LAB
ERYTHROCYTE [DISTWIDTH] IN BLOOD BY AUTOMATED COUNT: 15.9 % (ref 10–15)
FERRITIN SERPL-MCNC: 30 NG/ML (ref 6–175)
HCT VFR BLD AUTO: 43.2 % (ref 35–47)
HGB BLD-MCNC: 13.4 G/DL (ref 11.7–15.7)
MCH RBC QN AUTO: 28.3 PG (ref 26.5–33)
MCHC RBC AUTO-ENTMCNC: 31 G/DL (ref 31.5–36.5)
MCV RBC AUTO: 91 FL (ref 78–100)
PLATELET # BLD AUTO: 252 10E3/UL (ref 150–450)
RBC # BLD AUTO: 4.74 10E6/UL (ref 3.8–5.2)
TSH SERPL DL<=0.005 MIU/L-ACNC: 1.91 UIU/ML (ref 0.3–4.2)
VIT B12 SERPL-MCNC: 575 PG/ML (ref 232–1245)
WBC # BLD AUTO: 6 10E3/UL (ref 4–11)

## 2023-04-07 PROCEDURE — 82607 VITAMIN B-12: CPT | Performed by: INTERNAL MEDICINE

## 2023-04-07 PROCEDURE — 85027 COMPLETE CBC AUTOMATED: CPT | Performed by: INTERNAL MEDICINE

## 2023-04-07 PROCEDURE — 84443 ASSAY THYROID STIM HORMONE: CPT | Performed by: INTERNAL MEDICINE

## 2023-04-07 PROCEDURE — 82728 ASSAY OF FERRITIN: CPT | Performed by: INTERNAL MEDICINE

## 2023-04-07 PROCEDURE — 99214 OFFICE O/P EST MOD 30 MIN: CPT | Performed by: INTERNAL MEDICINE

## 2023-04-07 PROCEDURE — 36415 COLL VENOUS BLD VENIPUNCTURE: CPT | Performed by: INTERNAL MEDICINE

## 2023-04-07 ASSESSMENT — PAIN SCALES - GENERAL: PAINLEVEL: NO PAIN (0)

## 2023-04-07 NOTE — PATIENT INSTRUCTIONS
Avoid aspirin 7 days before the surgery. Avoid nonsteroidal anti-inflammatory pain medication like ibuprofen, Motrin, or Aleve 7 days before the surgery.  Tylenol is okay to use for pain.  Avoid any OTC multivitamins or herbal supplement 7 days before surgery   Resume after surgery     Labs today

## 2023-04-07 NOTE — PROGRESS NOTES
[address], [address]  [facility] [address]  Phone: [phone]  Primary Provider: Alyx Rico  Pre-op Performing Provider: LUIGI LINO      PREOPERATIVE EVALUATION:  Today's date: 4/7/2023    Omar Chatterjee is a 27 year old female who presents for a preoperative evaluation.       View : No data to display.              Surgical Information:  Surgery/Procedure: revision scar surgery   Surgery Location: [place] [facility]  Surgeon:   Surgery Date: 4/26/23  Time of Surgery: 12:30PM  Where patient plans to recover: At home with family  Fax number for surgical facility: [phone]    Assessment & Plan     The proposed surgical procedure is considered LOW risk.    Omar was seen today for pre-op exam.    Diagnoses and all orders for this visit:    Preoperative examination  No history of any anesthesia complications   No family history of any anesthesia complications.    Scar of neck  Comments:  painful scarring at the site of disc arthroplasty in anterior part of neck  She has an upcoming revision scar surgery by Dr. Pack on 04/26/2023.   This scar is irritating for her    History of neck surgery  Comments:  s/p disc arthroplasty C5-6    Hashimoto's thyroiditis  -     TSH with free T4 reflex; Future  On levothyroxine, provided by OB/GYN     Anemia, unspecified type  -     Ferritin; Future  -     Vitamin B12; Future  -     CBC with platelets; Future  She had low hemoglobin after giving birth. She is supplementing iron.   Recommended vitamin C/orange juice to help with absorption iron    Other orders  -     REVIEW OF HEALTH MAINTENANCE PROTOCOL ORDERS    Other  She declines COVID-19 booster  She has a recent delivery 3 and 1/2 weeks ago and is currently breastfeeding.  Advised patient to follow up with her PCP.     Instructed the patient to avoid aspirin, multivitamins, herbal supplements and NSAIDs such as ibuprofen, motrin or aleve 7 days before the  surgery. Can resume them afterward   Tylenol is okay to take  Hold prenatal. Can resume after surgery.   Can continue iron supplementation and levothyroxine       Risks and Recommendations:  The patient has the following additional risks and recommendations for perioperative complications:   - No identified additional risk factors other than previously addressed    Medication Instructions:  Patient is to take all scheduled medications on the day of surgery EXCEPT for modifications listed below:  Avoid aspirin 7 days before the surgery. Avoid nonsteroidal anti-inflammatory pain medication like ibuprofen, Motrin, or Aleve 7 days before the surgery.  Tylenol is okay to use for pain.  Avoid any OTC multivitamins or herbal supplement 7 days before surgery   Resume after surgery    RECOMMENDATION:  Patient is optimal for above procedure.      6}    Subjective     HPI related to upcoming procedure: Omar Chatterjee is a 27 year old, presenting here for pre op evaluation. She underwent disc arthroplasty C5-C6 on 04/07/2022 for her chronic neck pain - from neck injury. She then developed an irritating scar at the site of surgery - reportedly a reaction from the stitches. She has an upcoming revision scar surgery by Dr. Pack on 04/26/2023.         4/4/2023     9:52 AM   Preop Questions   1. Have you ever had a heart attack or stroke? No   2. Have you ever had surgery on your heart or blood vessels, such as a stent placement, a coronary artery bypass, or surgery on an artery in your head, neck, heart, or legs? No   3. Do you have chest pain with activity? No   4. Do you have a history of  heart failure? No   5. Do you currently have a cold, bronchitis or symptoms of other infection? No   6. Do you have a cough, shortness of breath, or wheezing? No   7. Do you or anyone in your family have previous history of blood clots? No   8. Do you or does anyone in your family have a serious bleeding problem such as prolonged bleeding  following surgeries or cuts? No   9. Have you ever had problems with anemia or been told to take iron pills? YES - after giving birth   10. Have you had any abnormal blood loss such as black, tarry or bloody stools, or abnormal vaginal bleeding? No   11. Have you ever had a blood transfusion? No   12. Are you willing to have a blood transfusion if it is medically needed before, during, or after your surgery? Yes   13. Have you or any of your relatives ever had problems with anesthesia? No   14. Do you have sleep apnea, excessive snoring or daytime drowsiness? No   15. Do you have any artifical heart valves or other implanted medical devices like a pacemaker, defibrillator, or continuous glucose monitor? No   16. Do you have artificial joints? No   17. Are you allergic to latex? No   18. Is there any chance that you may be pregnant? No       Health Care Directive:  Patient does not have a Health Care Directive or Living Will:     Preoperative Review of :  Reviewed and she is not on any medication currently      Review of Systems  CONSTITUTIONAL: NEGATIVE for fever, chills, change in weight  INTEGUMENTARY/SKIN: NEGATIVE for worrisome rashes, moles or lesions  EYES: NEGATIVE for vision changes or irritation  ENT/MOUTH: NEGATIVE for ear, mouth and throat problems  RESP: NEGATIVE for significant cough or SOB  CV: NEGATIVE for chest pain, palpitations or peripheral edema  GI: NEGATIVE for nausea, abdominal pain, heartburn, or change in bowel habits  : NEGATIVE for frequency, dysuria, or hematuria  MUSCULOSKELETAL: NEGATIVE for significant arthralgias or myalgia  NEURO: NEGATIVE for weakness, dizziness or paresthesias  ENDOCRINE: NEGATIVE for temperature intolerance, skin/hair changes  HEME: NEGATIVE for bleeding problems  PSYCHIATRIC: NEGATIVE for changes in mood or affect    Patient Active Problem List    Diagnosis Date Noted     Indication for care in labor or delivery 03/13/2023     Priority: Medium     Normal  delivery 03/13/2023     Priority: Medium     Hashimoto's thyroiditis 02/06/2022     Priority: Medium     Thyroid nodule 02/06/2022     Priority: Medium      Past Medical History:   Diagnosis Date     Hashimoto's thyroiditis      Thyroid nodule      Past Surgical History:   Procedure Laterality Date     ORTHOPEDIC SURGERY      excision of cervical intervertebral disc     Current Outpatient Medications   Medication Sig Dispense Refill     acetaminophen (TYLENOL) 325 MG tablet Take 2 tablets (650 mg) by mouth every 4 hours as needed for mild pain 40 tablet 0     ferrous sulfate (FEROSUL) 325 (65 Fe) MG tablet Take 1 tablet (325 mg) by mouth daily (with breakfast) 30 tablet 1     levothyroxine (SYNTHROID/LEVOTHROID) 25 MCG tablet TAKE 1 TABLET(25 MCG) BY MOUTH DAILY 90 tablet 0     Prenatal MV & Min w/FA-DHA (PRENATAL ADULT GUMMY/DHA/FA PO)        SENNA-docusate sodium (SENNA S) 8.6-50 MG tablet Take 1 tablet by mouth 2 times daily as needed 30 tablet 0       Allergies   Allergen Reactions     Azithromycin Shortness Of Breath and Other (See Comments)     This is from childhood, has taken it since without problem  Having breathing problems       Erythromycin      Stop Breathing, per pt. As a child from ear drops, has taken it since with no problems      Metoclopramide Other (See Comments)        Social History     Tobacco Use     Smoking status: Never     Smokeless tobacco: Never   Vaping Use     Vaping status: Never Used   Substance Use Topics     Alcohol use: No     Family History   Problem Relation Age of Onset     Hypertension Mother      History   Drug Use No         Objective     /85 (BP Location: Right arm, Patient Position: Sitting, Cuff Size: Adult Large)   Pulse 77   Temp 97.8  F (36.6  C) (Oral)   Resp 16   LMP 02/20/2022   SpO2 100%   Breastfeeding Yes     Physical Exam    GENERAL APPEARANCE: healthy, alert and no distress     EYES: EOMI, PERRL     HENT: ear canals and TM's normal and nose and  mouth without ulcers or lesions     NECK: no adenopathy, no  masses, or scars and thyroid normal to palpation     RESP: lungs clear to auscultation - no rales, rhonchi or wheezes     CV: regular rates and rhythm, normal S1 S2, no S3 or S4 and no murmur, click or rub     ABDOMEN:  soft, nontender, no HSM or masses and bowel sounds normal     MS: extremities normal- no gross deformities noted, no evidence of inflammation in joints, FROM in all extremities.     SKIN: surgical scar on the left side of the neck.      NEURO: Normal strength and tone, sensory exam grossly normal, mentation intact and speech normal     PSYCH: mentation appears normal. and affect normal/bright     LYMPHATICS: No cervical adenopathy    Recent Labs   Lab Test 03/14/23  0849 03/13/23  0745 01/13/23  1132 08/21/22  1953 08/16/22  1214   HGB 9.5* 10.8*   < > 13.5 13.5   PLT  --  204  --  232 218   NA  --   --   --  137 138   POTASSIUM  --   --   --  3.5 3.3*   CR  --   --   --  0.58 0.50*    < > = values in this interval not displayed.        Diagnostics:  Recent Results (from the past 24 hour(s))   CBC with platelets    Collection Time: 04/07/23  7:59 AM   Result Value Ref Range    WBC Count 6.0 4.0 - 11.0 10e3/uL    RBC Count 4.74 3.80 - 5.20 10e6/uL    Hemoglobin 13.4 11.7 - 15.7 g/dL    Hematocrit 43.2 35.0 - 47.0 %    MCV 91 78 - 100 fL    MCH 28.3 26.5 - 33.0 pg    MCHC 31.0 (L) 31.5 - 36.5 g/dL    RDW 15.9 (H) 10.0 - 15.0 %    Platelet Count 252 150 - 450 10e3/uL      No EKG required for low risk surgery (cataract, skin procedure, breast biopsy, etc).    Revised Cardiac Risk Index (RCRI):  The patient has the following serious cardiovascular risks for perioperative complications:   - No serious cardiac risks = 0 points     RCRI Interpretation: 0 points: Class I (very low risk - 0.4% complication rate)           Signed Electronically by: Scarlett Siddiqui MD  Copy of this evaluation report is provided to requesting physician.    This  document serves as a record of the services and decisions personally performed and made by Dr. Siddiqui. It was created on her behalf by [facility], a trained medical scribe. The creation of this document is based the provider's statements to the medical scribe.

## 2023-04-07 NOTE — RESULT ENCOUNTER NOTE
Cassandra Nelson    This is to inform you regarding your test result.    Ferritin which is iron stores in the body is low.  We want Ferritin level greater than   Take OTC Ferrous Sulfate 325 mg po every other day if you tolerate.  Take with vit C as vit C helps to absorb iron.  Iron can make you constipated so take stool softener.  Recheck ferritin in 4 months  Vitamin B 12 level is normal.  TSH which is thyroid hormone is normal.  CBC result which includes Hemoglobin and  Platelet Counts is satisfactory.  Your anemia is improving        Sincerely,      Dr.Nasima Artur MD,FACP

## 2023-04-15 ENCOUNTER — HEALTH MAINTENANCE LETTER (OUTPATIENT)
Age: 28
End: 2023-04-15

## 2023-04-27 ENCOUNTER — LAB REQUISITION (OUTPATIENT)
Dept: LAB | Facility: CLINIC | Age: 28
End: 2023-04-27
Payer: COMMERCIAL

## 2023-04-27 DIAGNOSIS — E06.3 AUTOIMMUNE THYROIDITIS: ICD-10-CM

## 2023-04-27 PROCEDURE — 84443 ASSAY THYROID STIM HORMONE: CPT | Mod: ORL | Performed by: OBSTETRICS & GYNECOLOGY

## 2023-04-28 LAB — TSH SERPL DL<=0.005 MIU/L-ACNC: 1.45 UIU/ML (ref 0.3–4.2)

## 2023-05-09 ENCOUNTER — MYC MEDICAL ADVICE (OUTPATIENT)
Dept: FAMILY MEDICINE | Facility: CLINIC | Age: 28
End: 2023-05-09
Payer: COMMERCIAL

## 2023-05-09 DIAGNOSIS — E04.1 THYROID NODULE: Primary | ICD-10-CM

## 2023-05-10 NOTE — TELEPHONE ENCOUNTER
See FYI from patient.     Rosina WU, RN   [facility] RN Triage Team

## 2023-05-10 NOTE — TELEPHONE ENCOUNTER
PCP- please see mychart:    Per chart review - patient's last US of thyroid on 2/4/22 and PCP advised f/u in one year     Also advised patient schedule future office visit (last appointment with Dr. Cruz on 2/1/22)    Renee Cota RN  [facility]

## 2023-05-19 ENCOUNTER — ANCILLARY PROCEDURE (OUTPATIENT)
Dept: ULTRASOUND IMAGING | Facility: CLINIC | Age: 28
End: 2023-05-19
Attending: INTERNAL MEDICINE
Payer: COMMERCIAL

## 2023-05-19 DIAGNOSIS — E04.1 THYROID NODULE: ICD-10-CM

## 2023-05-19 PROCEDURE — 76536 US EXAM OF HEAD AND NECK: CPT

## 2023-05-25 NOTE — RESULT ENCOUNTER NOTE
Jose Nelson,    I have had the opportunity to review your recent results and an interpretation is as follows:  Your follow-up ultrasound shows stable thyroid nodule which is thought to be benign and low risk for progression towards malignancy.  No further follow-up of this is needed.  This is an excellent result    Sincerely,  Chema Cruz MD

## 2023-08-21 DIAGNOSIS — E06.3 HASHIMOTO'S THYROIDITIS: ICD-10-CM

## 2023-08-21 RX ORDER — LEVOTHYROXINE SODIUM 25 UG/1
25 TABLET ORAL DAILY
Qty: 90 TABLET | Refills: 0 | Status: CANCELLED | OUTPATIENT
Start: 2023-08-21

## 2023-10-05 ASSESSMENT — ENCOUNTER SYMPTOMS
MYALGIAS: 0
PARESTHESIAS: 0
JOINT SWELLING: 0
CHILLS: 0
DIARRHEA: 0
DIZZINESS: 0
HEADACHES: 0
SORE THROAT: 0
HEARTBURN: 0
PALPITATIONS: 0
ARTHRALGIAS: 0
NERVOUS/ANXIOUS: 0
ABDOMINAL PAIN: 0
CONSTIPATION: 0
FREQUENCY: 0
HEMATURIA: 0
EYE PAIN: 0
DYSURIA: 0
SHORTNESS OF BREATH: 0
NAUSEA: 0
BREAST MASS: 0
HEMATOCHEZIA: 0
WEAKNESS: 0
COUGH: 0
FEVER: 0

## 2023-10-12 ENCOUNTER — OFFICE VISIT (OUTPATIENT)
Dept: FAMILY MEDICINE | Facility: CLINIC | Age: 28
End: 2023-10-12
Payer: COMMERCIAL

## 2023-10-12 VITALS
WEIGHT: 186.1 LBS | SYSTOLIC BLOOD PRESSURE: 119 MMHG | OXYGEN SATURATION: 90 % | HEART RATE: 80 BPM | TEMPERATURE: 97.9 F | HEIGHT: 66 IN | DIASTOLIC BLOOD PRESSURE: 83 MMHG | BODY MASS INDEX: 29.91 KG/M2 | RESPIRATION RATE: 18 BRPM

## 2023-10-12 DIAGNOSIS — L65.9 HAIR LOSS: ICD-10-CM

## 2023-10-12 DIAGNOSIS — E06.3 HASHIMOTO'S THYROIDITIS: ICD-10-CM

## 2023-10-12 DIAGNOSIS — Z79.899 MEDICATION MANAGEMENT: ICD-10-CM

## 2023-10-12 DIAGNOSIS — Z13.29 SCREENING FOR THYROID DISORDER: ICD-10-CM

## 2023-10-12 DIAGNOSIS — E01.0 THYROMEGALY: ICD-10-CM

## 2023-10-12 DIAGNOSIS — Z00.00 ROUTINE GENERAL MEDICAL EXAMINATION AT A HEALTH CARE FACILITY: Primary | ICD-10-CM

## 2023-10-12 DIAGNOSIS — Z98.890 HISTORY OF NECK SURGERY: ICD-10-CM

## 2023-10-12 DIAGNOSIS — R79.89 LOW VITAMIN D LEVEL: ICD-10-CM

## 2023-10-12 DIAGNOSIS — D64.9 ANEMIA, UNSPECIFIED TYPE: ICD-10-CM

## 2023-10-12 LAB
ALBUMIN SERPL BCG-MCNC: 4.6 G/DL (ref 3.5–5.2)
ALP SERPL-CCNC: 73 U/L (ref 35–104)
ALT SERPL W P-5'-P-CCNC: 10 U/L (ref 0–50)
ANION GAP SERPL CALCULATED.3IONS-SCNC: 12 MMOL/L (ref 7–15)
AST SERPL W P-5'-P-CCNC: 21 U/L (ref 0–45)
BILIRUB SERPL-MCNC: 0.2 MG/DL
BUN SERPL-MCNC: 14.7 MG/DL (ref 6–20)
CALCIUM SERPL-MCNC: 9.6 MG/DL (ref 8.6–10)
CHLORIDE SERPL-SCNC: 103 MMOL/L (ref 98–107)
CHOLEST SERPL-MCNC: 188 MG/DL
CREAT SERPL-MCNC: 0.66 MG/DL (ref 0.51–0.95)
CRP SERPL-MCNC: <3 MG/L
DEPRECATED HCO3 PLAS-SCNC: 25 MMOL/L (ref 22–29)
EGFRCR SERPLBLD CKD-EPI 2021: >90 ML/MIN/1.73M2
ERYTHROCYTE [DISTWIDTH] IN BLOOD BY AUTOMATED COUNT: 13.1 % (ref 10–15)
FERRITIN SERPL-MCNC: 20 NG/ML (ref 6–175)
GLUCOSE SERPL-MCNC: 76 MG/DL (ref 70–99)
HCT VFR BLD AUTO: 43.3 % (ref 35–47)
HDLC SERPL-MCNC: 72 MG/DL
HGB BLD-MCNC: 13.5 G/DL (ref 11.7–15.7)
LDLC SERPL CALC-MCNC: 101 MG/DL
MCH RBC QN AUTO: 29.3 PG (ref 26.5–33)
MCHC RBC AUTO-ENTMCNC: 31.2 G/DL (ref 31.5–36.5)
MCV RBC AUTO: 94 FL (ref 78–100)
NONHDLC SERPL-MCNC: 116 MG/DL
PLATELET # BLD AUTO: 219 10E3/UL (ref 150–450)
POTASSIUM SERPL-SCNC: 4.1 MMOL/L (ref 3.4–5.3)
PROT SERPL-MCNC: 7.4 G/DL (ref 6.4–8.3)
RBC # BLD AUTO: 4.6 10E6/UL (ref 3.8–5.2)
SODIUM SERPL-SCNC: 140 MMOL/L (ref 135–145)
T4 FREE SERPL-MCNC: 1.18 NG/DL (ref 0.9–1.7)
TRIGL SERPL-MCNC: 77 MG/DL
TSH SERPL DL<=0.005 MIU/L-ACNC: 6.48 UIU/ML (ref 0.3–4.2)
VIT B12 SERPL-MCNC: 692 PG/ML (ref 232–1245)
VIT D+METAB SERPL-MCNC: 28 NG/ML (ref 20–50)
WBC # BLD AUTO: 6.5 10E3/UL (ref 4–11)

## 2023-10-12 PROCEDURE — 84443 ASSAY THYROID STIM HORMONE: CPT | Performed by: INTERNAL MEDICINE

## 2023-10-12 PROCEDURE — 99395 PREV VISIT EST AGE 18-39: CPT | Performed by: INTERNAL MEDICINE

## 2023-10-12 PROCEDURE — 85027 COMPLETE CBC AUTOMATED: CPT | Performed by: INTERNAL MEDICINE

## 2023-10-12 PROCEDURE — 36415 COLL VENOUS BLD VENIPUNCTURE: CPT | Performed by: INTERNAL MEDICINE

## 2023-10-12 PROCEDURE — 86140 C-REACTIVE PROTEIN: CPT | Performed by: INTERNAL MEDICINE

## 2023-10-12 PROCEDURE — 82306 VITAMIN D 25 HYDROXY: CPT | Performed by: INTERNAL MEDICINE

## 2023-10-12 PROCEDURE — 80061 LIPID PANEL: CPT | Performed by: INTERNAL MEDICINE

## 2023-10-12 PROCEDURE — 84439 ASSAY OF FREE THYROXINE: CPT | Performed by: INTERNAL MEDICINE

## 2023-10-12 PROCEDURE — 82607 VITAMIN B-12: CPT | Performed by: INTERNAL MEDICINE

## 2023-10-12 PROCEDURE — 82728 ASSAY OF FERRITIN: CPT | Performed by: INTERNAL MEDICINE

## 2023-10-12 PROCEDURE — 99213 OFFICE O/P EST LOW 20 MIN: CPT | Mod: 25 | Performed by: INTERNAL MEDICINE

## 2023-10-12 PROCEDURE — 80053 COMPREHEN METABOLIC PANEL: CPT | Performed by: INTERNAL MEDICINE

## 2023-10-12 RX ORDER — ACETAMINOPHEN AND CODEINE PHOSPHATE 120; 12 MG/5ML; MG/5ML
1 SOLUTION ORAL
COMMUNITY
Start: 2023-07-16 | End: 2024-07-19

## 2023-10-12 ASSESSMENT — ENCOUNTER SYMPTOMS
COUGH: 0
SHORTNESS OF BREATH: 0
CONSTIPATION: 0
DIARRHEA: 0
NERVOUS/ANXIOUS: 0
HEARTBURN: 0
HEMATOCHEZIA: 0
BREAST MASS: 0
NAUSEA: 0
FEVER: 0
SORE THROAT: 0
HEADACHES: 0
EYE PAIN: 0
WEAKNESS: 0
JOINT SWELLING: 0
MYALGIAS: 0
ABDOMINAL PAIN: 0
DIZZINESS: 0
ARTHRALGIAS: 0
PARESTHESIAS: 0
FREQUENCY: 0
DYSURIA: 0
CHILLS: 0
HEMATURIA: 0
PALPITATIONS: 0

## 2023-10-12 ASSESSMENT — PAIN SCALES - GENERAL: PAINLEVEL: NO PAIN (0)

## 2023-10-12 NOTE — PATIENT INSTRUCTIONS
Labs today  Please call radiology by dialing  [phone] to schedule your thyroid ultrasound     Follow up in one year for physical   Seek sooner medical attention if there is any worsening of symptoms or problems.       Preventive Health Recommendations  Female Ages 26 - 39  Yearly exam:   See your health care provider every year in order to  Review health changes.   Discuss preventive care.    Review your medicines if you your doctor has prescribed any.    Until age 30: Get a Pap test every three years (more often if you have had an abnormal result).    After age 30: Talk to your doctor about whether you should have a Pap test every 3 years or have a Pap test with HPV screening every 5 years.   You do not need a Pap test if your uterus was removed (hysterectomy) and you have not had cancer.  You should be tested each year for STDs (sexually transmitted diseases), if you're at risk.   Talk to your provider about how often to have your cholesterol checked.  If you are at risk for diabetes, you should have a diabetes test (fasting glucose).  Shots: Get a flu shot each year. Get a tetanus shot every 10 years.   Nutrition:   Eat at least 5 servings of fruits and vegetables each day.  Eat whole-grain bread, whole-wheat pasta and brown rice instead of white grains and rice.  Get adequate Calcium and Vitamin D.     Lifestyle  Exercise at least 150 minutes a week (30 minutes a day, 5 days of the week). This will help you control your weight and prevent disease.  Limit alcohol to one drink per day.  No smoking.   Wear sunscreen to prevent skin cancer.  See your dentist every six months for an exam and cleaning.

## 2023-10-12 NOTE — PROGRESS NOTES
SUBJECTIVE:   CC: Omar is an 28 year old who presents for preventive health visit.       Healthy Habits:     Getting at least 3 servings of Calcium per day:  Yes    Bi-annual eye exam:  Yes    Dental care twice a year:  Yes    Sleep apnea or symptoms of sleep apnea:  None    Diet:  Regular (no restrictions)    Frequency of exercise:  4-5 days/week    Duration of exercise:  30-45 minutes    Taking medications regularly:  Yes    Medication side effects:  None    Additional concerns today:  Yes        Have you ever done Advance Care Planning? (For example, a Health Directive, POLST, or a discussion with a medical provider or your loved ones about your wishes): No, advance care planning information given to patient to review.  Patient plans to discuss their wishes with loved ones or provider.      Social History     Tobacco Use     Smoking status: Never     Smokeless tobacco: Never   Substance Use Topics     Alcohol use: No             10/5/2023     5:36 PM   Alcohol Use   Prescreen: >3 drinks/day or >7 drinks/week? Not Applicable          No data to display              Reviewed orders with patient.  Reviewed health maintenance and updated orders accordingly - Yes      Breast Cancer Screening:        3/18/2022    12:45 PM   Breast CA Risk Assessment (FHS-7)   Do you have a family history of breast, colon, or ovarian cancer? No / Unknown    No / Unknown           Pertinent mammograms are reviewed under the imaging tab.    History of abnormal Pap smear: NO - age 21-29 PAP every 3 years recommended     Reviewed and updated as needed this visit by clinical staff   Tobacco  Allergies  Meds   Med Hx            Reviewed and updated as needed this visit by Provider       Med Hx               Review of Systems   Constitutional:  Negative for chills and fever.   HENT:  Negative for congestion, ear pain, hearing loss and sore throat.    Eyes:  Negative for pain and visual disturbance.   Respiratory:  Negative for cough  "and shortness of breath.    Cardiovascular:  Negative for chest pain, palpitations and peripheral edema.   Gastrointestinal:  Negative for abdominal pain, constipation, diarrhea, heartburn, hematochezia and nausea.   Breasts:  Negative for tenderness, breast mass and discharge.   Genitourinary:  Negative for dysuria, frequency, genital sores, hematuria, pelvic pain, urgency, vaginal bleeding and vaginal discharge.   Musculoskeletal:  Negative for arthralgias, joint swelling and myalgias.   Skin:  Negative for rash.   Neurological:  Negative for dizziness, weakness, headaches and paresthesias.   Psychiatric/Behavioral:  Negative for mood changes. The patient is not nervous/anxious.    Complaining of hair loss  She is postpartum 7 months  At this point she is not taking any iron as could not tolerate  She is breast-feeding  Does not take prenatal vitamins       OBJECTIVE:   /83 (BP Location: Right arm, Patient Position: Sitting, Cuff Size: Adult Regular)   Pulse 80   Temp 97.9  F (36.6  C) (Oral)   Resp 18   Ht 1.676 m (5' 6\")   Wt 84.4 kg (186 lb 1.6 oz)   LMP 10/03/2023 (Exact Date)   SpO2 90%   Breastfeeding Yes   BMI 30.04 kg/m    Physical Exam  GENERAL: healthy, alert and no distress  EYES: Eyes grossly normal to inspection, PERRL and conjunctivae and sclerae normal  HENT: ear canals and TM's normal, nose and mouth without ulcers or lesions  NECK: no adenopathy, slightly enlarged thyroid especially on the right side  She has a scar of neck surgery  RESP: lungs clear to auscultation - no rales, rhonchi or wheezes  BREAST: normal without masses, tenderness or nipple discharge and no palpable axillary masses or adenopathy  CV: regular rate and rhythm, normal S1 S2, no S3 or S4, no murmur, click or rub, no peripheral edema and peripheral pulses strong  ABDOMEN: soft, nontender, no hepatosplenomegaly, no masses and bowel sounds normal  MS: no gross musculoskeletal defects noted, no edema  SKIN: no " suspicious lesions or rashes  NEURO: Normal strength and tone, mentation intact and speech normal  PSYCH: mentation appears normal, affect normal/bright        ASSESSMENT/PLAN:   Omar was seen today for physical.    Diagnoses and all orders for this visit:    Routine general medical examination at a health care facility  Preventive health counseling was also done.  She had Pap smear done on January 2022  Discussed the importance of taking adequate calcium and vitamin D    Hashimoto's thyroiditis  -     TSH with free T4 reflex; Future  -     CRP, inflammation; Future  -     US Thyroid; Future  -     TSH with free T4 reflex  -     CRP, inflammation  Will recheck her levels  I also ordered ultrasound as right side of thyroid is causing intermittent swelling    Anemia, unspecified type  -     CBC with platelets; Future  -     Vitamin B12; Future  -     Ferritin; Future  -     CBC with platelets  -     Vitamin B12  -     Ferritin  Will do the work-up for anemia    History of neck surgery  Past history    Hair loss  It could be due to postpartum.  But I will do work-up to rule out any other etiology    Low vitamin D level  -     Vitamin D Deficiency; Future  -     Vitamin D Deficiency    Medication management  -     Comprehensive metabolic panel; Future  -     Comprehensive metabolic panel    Screening for thyroid disorder  -     Lipid panel reflex to direct LDL Non-fasting; Future  -     Lipid panel reflex to direct LDL Non-fasting    Thyromegaly  -     US Thyroid; Future    Other orders  -     PRIMARY CARE FOLLOW-UP SCHEDULING; Future    She does not want COVID booster today    We also discussed weight management but she is breast-feeding  Wegovy and Saxenda are on backorder  Once she is done with her breast-feeding and in future she can come back for follow-up and we can have a discussion  Patient is in agreement with this      Disclaimer: This note consists of symbols derived from keyboarding, dictation and/or voice  recognition software. As a result, there may be errors in the script that have gone undetected. Please consider this when interpreting information found in this chart.      COUNSELING:  Reviewed preventive health counseling, as reflected in patient instructions       Regular exercise       Healthy diet/nutrition        She reports that she has never smoked. She has never used smokeless tobacco.          Scarlett Siddiqui MD  [facility]

## 2023-10-12 NOTE — LETTER
January 2, 2024      Omar Chatterjee  [address] [address]        Dear ,    We are writing to inform you of your test results.    Cassandra Nelson,     You are due for lab work for thyroid   Please call the clinic and schedule lab appointment - [phone].    Sincerely,       Dr.Nasima Artur MD,FACP     Resulted Orders   TSH with free T4 reflex   Result Value Ref Range    TSH 6.48 (H) 0.30 - 4.20 uIU/mL   CBC with platelets   Result Value Ref Range    WBC Count 6.5 4.0 - 11.0 10e3/uL    RBC Count 4.60 3.80 - 5.20 10e6/uL    Hemoglobin 13.5 11.7 - 15.7 g/dL    Hematocrit 43.3 35.0 - 47.0 %    MCV 94 78 - 100 fL    MCH 29.3 26.5 - 33.0 pg    MCHC 31.2 (L) 31.5 - 36.5 g/dL    RDW 13.1 10.0 - 15.0 %    Platelet Count 219 150 - 450 10e3/uL   Vitamin B12   Result Value Ref Range    Vitamin B12 692 232 - 1,245 pg/mL   Ferritin   Result Value Ref Range    Ferritin 20 6 - 175 ng/mL   Vitamin D Deficiency   Result Value Ref Range    Vitamin D, Total (25-Hydroxy) 28 20 - 50 ng/mL      Comment:      optimum levels    Narrative    Season, race, dietary intake, and treatment affect the concentration of 25-hydroxy-Vitamin D. Values may decrease during winter months and increase during summer months.    Vitamin D determination is routinely performed by an immunoassay specific for 25 hydroxyvitamin D3.  If an individual is on vitamin D2(ergocalciferol) supplementation, please specify 25 OH vitamin D2 and D3 level determination by LCMSMS test VITD23.     Comprehensive metabolic panel   Result Value Ref Range    Sodium 140 135 - 145 mmol/L      Comment:      Reference intervals for this test were updated on 09/26/2023 to more accurately reflect our healthy population. There may be differences in the flagging of prior results with similar values performed with this method. Interpretation of those prior results can be made in the context of the updated reference intervals.     Potassium 4.1 3.4 - 5.3 mmol/L     Carbon Dioxide (CO2) 25 22 - 29 mmol/L    Anion Gap 12 7 - 15 mmol/L    Urea Nitrogen 14.7 6.0 - 20.0 mg/dL    Creatinine 0.66 0.51 - 0.95 mg/dL    GFR Estimate >90 >60 mL/min/1.73m2    Calcium 9.6 8.6 - 10.0 mg/dL    Chloride 103 98 - 107 mmol/L    Glucose 76 70 - 99 mg/dL    Alkaline Phosphatase 73 35 - 104 U/L    AST 21 0 - 45 U/L      Comment:      Reference intervals for this test were updated on 6/12/2023 to more accurately reflect our healthy population. There may be differences in the flagging of prior results with similar values performed with this method. Interpretation of those prior results can be made in the context of the updated reference intervals.    ALT 10 0 - 50 U/L      Comment:      Reference intervals for this test were updated on 6/12/2023 to more accurately reflect our healthy population. There may be differences in the flagging of prior results with similar values performed with this method. Interpretation of those prior results can be made in the context of the updated reference intervals.      Protein Total 7.4 6.4 - 8.3 g/dL    Albumin 4.6 3.5 - 5.2 g/dL    Bilirubin Total 0.2 <=1.2 mg/dL   Lipid panel reflex to direct LDL Non-fasting   Result Value Ref Range    Cholesterol 188 <200 mg/dL    Triglycerides 77 <150 mg/dL    Direct Measure HDL 72 >=50 mg/dL    LDL Cholesterol Calculated 101 (H) <=100 mg/dL    Non HDL Cholesterol 116 <130 mg/dL    Narrative    Cholesterol  Desirable:  <200 mg/dL    Triglycerides  Normal:  Less than 150 mg/dL  Borderline High:  150-199 mg/dL  High:  200-499 mg/dL  Very High:  Greater than or equal to 500 mg/dL    Direct Measure HDL  Female:  Greater than or equal to 50 mg/dL   Male:  Greater than or equal to 40 mg/dL    LDL Cholesterol  Desirable:  <100mg/dL  Above Desirable:  100-129 mg/dL   Borderline High:  130-159 mg/dL   High:  160-189 mg/dL   Very High:  >= 190 mg/dL    Non HDL Cholesterol  Desirable:  130 mg/dL  Above Desirable:  130-159  mg/dL  Borderline High:  160-189 mg/dL  High:  190-219 mg/dL  Very High:  Greater than or equal to 220 mg/dL   CRP, inflammation   Result Value Ref Range    CRP Inflammation <3.00 <5.00 mg/L   T4 free   Result Value Ref Range    Free T4 1.18 0.90 - 1.70 ng/dL       If you have any questions or concerns, please call the clinic at the number listed above.       Sincerely,      Scarlett Siddiqui MD, FACP

## 2023-10-13 DIAGNOSIS — E61.1 IRON DEFICIENCY: Primary | ICD-10-CM

## 2023-10-13 DIAGNOSIS — E03.9 HYPOTHYROIDISM, UNSPECIFIED TYPE: ICD-10-CM

## 2023-10-13 DIAGNOSIS — E06.3 HASHIMOTO'S THYROIDITIS: ICD-10-CM

## 2023-10-13 RX ORDER — LEVOTHYROXINE SODIUM 50 UG/1
50 TABLET ORAL DAILY
Qty: 90 TABLET | Refills: 0 | Status: SHIPPED | OUTPATIENT
Start: 2023-10-13 | End: 2023-12-24

## 2023-10-13 NOTE — RESULT ENCOUNTER NOTE
Casasndra Nelson    This is to inform you regarding your test result.    I tried to contact you but got the VM.  TSH which is thyroid hormone is elevated   Increase levothyroxine to  50 mcg po daily  You ar on 25  mcg currently so we are increasing to 50 mcg po daily.  Please make lab appointment to recheck TSH in 2 months.  Orders are placed.  New script is sent to [facility] in [place]  Your total cholesterol is normal.  HDL which is called good cholesterol is normal.  Your LDL cholesterol is elevated   Your triglycerides are normal.  Vitamin B 12 level is normal.  Ferritin which is iron stores in the body is low.  We want Ferritin level greater than   Take OTC Ferrous Sulfate 325 mg po every other day if you tolerate.  Take with vit C as vit C helps to absorb iron.  Iron can make you constipated so take stool softener.  You could try 3 times a week if every other day iron does not work  Recheck ferritin in 4 months  The testing of your blood sugar, kidney function, liver function and electrolytes was normal.  C-reactive protein which is test to check for inflammation is normal  Vitamin D level is normal.  CBC result which includes Hemoglobin and  Platelet Counts is satisfactory.  Please acknowledge when you see the message         Sincerely,      Dr.Nasima Artur MD,FACP

## 2023-10-19 ENCOUNTER — ANCILLARY PROCEDURE (OUTPATIENT)
Dept: ULTRASOUND IMAGING | Facility: CLINIC | Age: 28
End: 2023-10-19
Attending: INTERNAL MEDICINE
Payer: COMMERCIAL

## 2023-10-19 DIAGNOSIS — E01.0 THYROMEGALY: ICD-10-CM

## 2023-10-19 DIAGNOSIS — E06.3 HASHIMOTO'S THYROIDITIS: ICD-10-CM

## 2023-10-19 PROCEDURE — 76536 US EXAM OF HEAD AND NECK: CPT

## 2023-10-20 NOTE — RESULT ENCOUNTER NOTE
Cassandra Nelson    This is to inform you regarding your test result.    Your ultrasound shows satisfactory finding   It shows mild increased vascularity due to thyroiditis  Thyroid nodule is unchanged   Follow up appointment in 6 months   Will repeat ultrasound in one year      Sincerely,      Dr.Nasima Artur MD,FACP

## 2023-12-21 ENCOUNTER — LAB (OUTPATIENT)
Dept: LAB | Facility: CLINIC | Age: 28
End: 2023-12-21
Payer: COMMERCIAL

## 2023-12-21 DIAGNOSIS — E06.3 HASHIMOTO'S THYROIDITIS: ICD-10-CM

## 2023-12-21 DIAGNOSIS — E61.1 IRON DEFICIENCY: ICD-10-CM

## 2023-12-21 LAB
FERRITIN SERPL-MCNC: 39 NG/ML (ref 6–175)
TSH SERPL DL<=0.005 MIU/L-ACNC: 2.35 UIU/ML (ref 0.3–4.2)

## 2023-12-21 PROCEDURE — 36415 COLL VENOUS BLD VENIPUNCTURE: CPT

## 2023-12-21 PROCEDURE — 84443 ASSAY THYROID STIM HORMONE: CPT

## 2023-12-21 PROCEDURE — 82728 ASSAY OF FERRITIN: CPT

## 2023-12-21 NOTE — RESULT ENCOUNTER NOTE
Cassandra Nelson    This is to inform you regarding your test result.    TSH which is thyroid hormone is normal.  Stay on current dose of levothyroxine.  Recheck TSH in 6 months      Sincerely,      Dr.Nasima Artur MD,FACP

## 2023-12-24 ENCOUNTER — MYC REFILL (OUTPATIENT)
Dept: FAMILY MEDICINE | Facility: CLINIC | Age: 28
End: 2023-12-24
Payer: COMMERCIAL

## 2023-12-24 DIAGNOSIS — E06.3 HASHIMOTO'S THYROIDITIS: ICD-10-CM

## 2023-12-26 RX ORDER — LEVOTHYROXINE SODIUM 50 UG/1
50 TABLET ORAL DAILY
Qty: 90 TABLET | Refills: 2 | Status: SHIPPED | OUTPATIENT
Start: 2023-12-26

## 2023-12-31 ENCOUNTER — TELEPHONE (OUTPATIENT)
Dept: FAMILY MEDICINE | Facility: CLINIC | Age: 28
End: 2023-12-31
Payer: COMMERCIAL

## 2023-12-31 DIAGNOSIS — E61.1 IRON DEFICIENCY: ICD-10-CM

## 2023-12-31 DIAGNOSIS — E03.9 HYPOTHYROIDISM, UNSPECIFIED TYPE: Primary | ICD-10-CM

## 2023-12-31 NOTE — TELEPHONE ENCOUNTER
----- Message from Scarlett Siddiqui MD sent at 10/13/2023  8:03 AM CDT -----  Cassandra Nelson    This is to inform you regarding your test result.    I tried to contact you but got the VM.  TSH which is thyroid hormone is elevated   Increase levothyroxine to  50 mcg po daily  You ar on 25  mcg currently so we are increasing to 50 mcg po daily.  Please make lab appointment to recheck TSH in 2 months.  Orders are placed.  New script is sent to [facility] in [place]  Your total cholesterol is normal.  HDL which is called good cholesterol is normal.  Your LDL cholesterol is elevated   Your triglycerides are normal.  Vitamin B 12 level is normal.  Ferritin which is iron stores in the body is low.  We want Ferritin level greater than   Take OTC Ferrous Sulfate 325 mg po every other day if you tolerate.  Take with vit C as vit C helps to absorb iron.  Iron can make you constipated so take stool softener.  You could try 3 times a week if every other day iron does not work  Recheck ferritin in 4 months  The testing of your blood sugar, kidney function, liver function and electrolytes was normal.  C-reactive protein which is test to check for inflammation is normal  Vitamin D level is normal.  CBC result which includes Hemoglobin and  Platelet Counts is satisfactory.  Please acknowledge when you see the message         Sincerely,      Dr.Nasima Artur MD,FACP

## 2023-12-31 NOTE — RESULT ENCOUNTER NOTE
Please notify patient by sending following letter with copy of test results      Cassandra Nelson,    You are due for lab work for thyroid   Please call the clinic and schedule lab appointment .        Sincerely,      Dr.Nasima Artur MD,FACP

## 2024-02-01 ENCOUNTER — LAB REQUISITION (OUTPATIENT)
Dept: LAB | Facility: CLINIC | Age: 29
End: 2024-02-01

## 2024-02-01 DIAGNOSIS — Z86.2 PERSONAL HISTORY OF DISEASES OF THE BLOOD AND BLOOD-FORMING ORGANS AND CERTAIN DISORDERS INVOLVING THE IMMUNE MECHANISM: ICD-10-CM

## 2024-02-01 LAB
BASOPHILS # BLD AUTO: 0.1 10E3/UL (ref 0–0.2)
BASOPHILS NFR BLD AUTO: 1 %
EOSINOPHIL # BLD AUTO: 0.1 10E3/UL (ref 0–0.7)
EOSINOPHIL NFR BLD AUTO: 1 %
ERYTHROCYTE [DISTWIDTH] IN BLOOD BY AUTOMATED COUNT: 13.1 % (ref 10–15)
HCT VFR BLD AUTO: 42.6 % (ref 35–47)
HGB BLD-MCNC: 14.1 G/DL (ref 11.7–15.7)
IMM GRANULOCYTES # BLD: 0 10E3/UL
IMM GRANULOCYTES NFR BLD: 1 %
LYMPHOCYTES # BLD AUTO: 1.9 10E3/UL (ref 0.8–5.3)
LYMPHOCYTES NFR BLD AUTO: 22 %
MCH RBC QN AUTO: 30.4 PG (ref 26.5–33)
MCHC RBC AUTO-ENTMCNC: 33.1 G/DL (ref 31.5–36.5)
MCV RBC AUTO: 92 FL (ref 78–100)
MONOCYTES # BLD AUTO: 0.5 10E3/UL (ref 0–1.3)
MONOCYTES NFR BLD AUTO: 6 %
NEUTROPHILS # BLD AUTO: 6 10E3/UL (ref 1.6–8.3)
NEUTROPHILS NFR BLD AUTO: 69 %
NRBC # BLD AUTO: 0 10E3/UL
NRBC BLD AUTO-RTO: 0 /100
PLATELET # BLD AUTO: 256 10E3/UL (ref 150–450)
RBC # BLD AUTO: 4.64 10E6/UL (ref 3.8–5.2)
WBC # BLD AUTO: 8.6 10E3/UL (ref 4–11)

## 2024-02-01 PROCEDURE — 83540 ASSAY OF IRON: CPT | Performed by: OBSTETRICS & GYNECOLOGY

## 2024-02-01 PROCEDURE — 85025 COMPLETE CBC W/AUTO DIFF WBC: CPT | Performed by: OBSTETRICS & GYNECOLOGY

## 2024-02-02 LAB — IRON SERPL-MCNC: 74 UG/DL (ref 37–145)

## 2024-02-22 ENCOUNTER — LAB REQUISITION (OUTPATIENT)
Dept: LAB | Facility: CLINIC | Age: 29
End: 2024-02-22

## 2024-02-22 DIAGNOSIS — R61 GENERALIZED HYPERHIDROSIS: ICD-10-CM

## 2024-02-22 LAB
ESTRADIOL SERPL-MCNC: 112 PG/ML
FSH SERPL IRP2-ACNC: 4.4 MIU/ML
HBA1C MFR BLD: 5.4 %
HOLD SPECIMEN: NORMAL
LH SERPL-ACNC: 10.5 MIU/ML
T4 FREE SERPL-MCNC: 1.32 NG/DL (ref 0.9–1.7)
TSH SERPL DL<=0.005 MIU/L-ACNC: 1.71 UIU/ML (ref 0.3–4.2)

## 2024-02-22 PROCEDURE — 83001 ASSAY OF GONADOTROPIN (FSH): CPT | Performed by: OBSTETRICS & GYNECOLOGY

## 2024-02-22 PROCEDURE — 84439 ASSAY OF FREE THYROXINE: CPT | Performed by: OBSTETRICS & GYNECOLOGY

## 2024-02-22 PROCEDURE — 83002 ASSAY OF GONADOTROPIN (LH): CPT | Performed by: OBSTETRICS & GYNECOLOGY

## 2024-02-22 PROCEDURE — 82670 ASSAY OF TOTAL ESTRADIOL: CPT | Performed by: OBSTETRICS & GYNECOLOGY

## 2024-02-22 PROCEDURE — 83036 HEMOGLOBIN GLYCOSYLATED A1C: CPT | Performed by: OBSTETRICS & GYNECOLOGY

## 2024-02-22 PROCEDURE — 84403 ASSAY OF TOTAL TESTOSTERONE: CPT | Performed by: OBSTETRICS & GYNECOLOGY

## 2024-02-22 PROCEDURE — 84443 ASSAY THYROID STIM HORMONE: CPT | Performed by: OBSTETRICS & GYNECOLOGY

## 2024-02-25 LAB — TESTOST SERPL-MCNC: 21 NG/DL (ref 8–60)

## 2024-04-04 ENCOUNTER — LAB REQUISITION (OUTPATIENT)
Dept: LAB | Facility: CLINIC | Age: 29
End: 2024-04-04

## 2024-04-04 ENCOUNTER — LAB REQUISITION (OUTPATIENT)
Dept: LAB | Facility: CLINIC | Age: 29
End: 2024-04-04
Payer: COMMERCIAL

## 2024-04-04 DIAGNOSIS — Z87.42 PERSONAL HISTORY OF OTHER DISEASES OF THE FEMALE GENITAL TRACT: ICD-10-CM

## 2024-04-04 PROCEDURE — 87491 CHLMYD TRACH DNA AMP PROBE: CPT | Performed by: OBSTETRICS & GYNECOLOGY

## 2024-04-04 PROCEDURE — 87798 DETECT AGENT NOS DNA AMP: CPT | Mod: ORL | Performed by: OBSTETRICS & GYNECOLOGY

## 2024-04-05 LAB
C TRACH DNA SPEC QL PROBE+SIG AMP: NEGATIVE
N GONORRHOEA DNA SPEC QL NAA+PROBE: NEGATIVE

## 2024-04-06 LAB
M GENITALIUM DNA SPEC QL NAA+PROBE: NOT DETECTED
M HOMINIS DNA SPEC QL NAA+PROBE: NOT DETECTED
U PARVUM DNA SPEC QL NAA+PROBE: NOT DETECTED
U UREALYTICUM DNA SPEC QL NAA+PROBE: NOT DETECTED

## 2024-06-20 ENCOUNTER — LAB REQUISITION (OUTPATIENT)
Dept: LAB | Facility: CLINIC | Age: 29
End: 2024-06-20

## 2024-06-20 DIAGNOSIS — N89.8 OTHER SPECIFIED NONINFLAMMATORY DISORDERS OF VAGINA: ICD-10-CM

## 2024-06-20 PROCEDURE — 0352U MULTIPLEX VAGINAL PANEL BY PCR: CPT | Performed by: OBSTETRICS & GYNECOLOGY

## 2024-06-21 LAB
BACTERIAL VAGINOSIS VAG-IMP: NEGATIVE
CANDIDA DNA VAG QL NAA+PROBE: NOT DETECTED
CANDIDA GLABRATA / CANDIDA KRUSEI DNA: NOT DETECTED
T VAGINALIS DNA VAG QL NAA+PROBE: NOT DETECTED

## 2024-07-03 ENCOUNTER — LAB (OUTPATIENT)
Dept: LAB | Facility: CLINIC | Age: 29
End: 2024-07-03
Payer: COMMERCIAL

## 2024-07-03 DIAGNOSIS — E03.9 HYPOTHYROIDISM, UNSPECIFIED TYPE: ICD-10-CM

## 2024-07-03 DIAGNOSIS — E61.1 IRON DEFICIENCY: ICD-10-CM

## 2024-07-03 LAB — HGB BLD-MCNC: 13.1 G/DL (ref 11.7–15.7)

## 2024-07-03 PROCEDURE — 85018 HEMOGLOBIN: CPT

## 2024-07-03 PROCEDURE — 36415 COLL VENOUS BLD VENIPUNCTURE: CPT

## 2024-07-03 PROCEDURE — 84443 ASSAY THYROID STIM HORMONE: CPT

## 2024-07-03 PROCEDURE — 82728 ASSAY OF FERRITIN: CPT

## 2024-07-03 NOTE — RESULT ENCOUNTER NOTE
Hello -    I'm covering for Dr. Siddiqui today  Here are my comments about the recent results: your hemoglobin was normal.     Please let us know if you have any questions or concerns.    Regards,  Alis Woodard PA-C

## 2024-07-04 LAB
FERRITIN SERPL-MCNC: 12 NG/ML (ref 6–175)
TSH SERPL DL<=0.005 MIU/L-ACNC: 1.43 UIU/ML (ref 0.3–4.2)

## 2024-07-04 NOTE — RESULT ENCOUNTER NOTE
Hello -    I'm covering for Dr. Siddiqui today    Here are my comments about the recent results: your thyroid stimulating hormone was normal.  Your ferritin (iron stores in body) is too low.  You should start an iron supplement like ferrous sulfate 325mg every other day (over the counter).      Please let us know if you have any questions or concerns.    Regards,  Alis Woodard PA-C

## 2024-07-19 ENCOUNTER — ANCILLARY PROCEDURE (OUTPATIENT)
Dept: GENERAL RADIOLOGY | Facility: CLINIC | Age: 29
End: 2024-07-19
Attending: NURSE PRACTITIONER
Payer: COMMERCIAL

## 2024-07-19 ENCOUNTER — OFFICE VISIT (OUTPATIENT)
Dept: URGENT CARE | Facility: URGENT CARE | Age: 29
End: 2024-07-19
Payer: COMMERCIAL

## 2024-07-19 VITALS
DIASTOLIC BLOOD PRESSURE: 87 MMHG | OXYGEN SATURATION: 96 % | BODY MASS INDEX: 25.34 KG/M2 | HEART RATE: 67 BPM | RESPIRATION RATE: 22 BRPM | TEMPERATURE: 100.9 F | WEIGHT: 157 LBS | SYSTOLIC BLOOD PRESSURE: 125 MMHG

## 2024-07-19 DIAGNOSIS — J06.9 VIRAL URI WITH COUGH: Primary | ICD-10-CM

## 2024-07-19 DIAGNOSIS — R50.9 FEVER AND CHILLS: ICD-10-CM

## 2024-07-19 DIAGNOSIS — R06.02 SOB (SHORTNESS OF BREATH): ICD-10-CM

## 2024-07-19 DIAGNOSIS — R09.81 CONGESTION OF PARANASAL SINUS: ICD-10-CM

## 2024-07-19 DIAGNOSIS — R05.1 ACUTE COUGH: ICD-10-CM

## 2024-07-19 LAB
DEPRECATED S PYO AG THROAT QL EIA: NEGATIVE
FLUAV AG SPEC QL IA: NEGATIVE
FLUBV AG SPEC QL IA: NEGATIVE
GROUP A STREP BY PCR: NOT DETECTED
SARS-COV-2 RNA RESP QL NAA+PROBE: POSITIVE

## 2024-07-19 PROCEDURE — 87651 STREP A DNA AMP PROBE: CPT | Performed by: NURSE PRACTITIONER

## 2024-07-19 PROCEDURE — 87635 SARS-COV-2 COVID-19 AMP PRB: CPT | Performed by: NURSE PRACTITIONER

## 2024-07-19 PROCEDURE — 99213 OFFICE O/P EST LOW 20 MIN: CPT | Performed by: NURSE PRACTITIONER

## 2024-07-19 PROCEDURE — 87804 INFLUENZA ASSAY W/OPTIC: CPT | Performed by: NURSE PRACTITIONER

## 2024-07-19 PROCEDURE — 71046 X-RAY EXAM CHEST 2 VIEWS: CPT | Mod: TC | Performed by: RADIOLOGY

## 2024-07-19 RX ORDER — NORGESTIMATE AND ETHINYL ESTRADIOL 7DAYSX3 LO
KIT ORAL
COMMUNITY
Start: 2024-06-28

## 2024-07-19 RX ORDER — NORGESTIMATE AND ETHINYL ESTRADIOL 7DAYSX3 28
1 KIT ORAL
COMMUNITY
Start: 2024-06-14 | End: 2024-07-19

## 2024-07-19 RX ORDER — PREDNISONE 20 MG/1
TABLET ORAL
Qty: 20 TABLET | Refills: 0 | Status: SHIPPED | OUTPATIENT
Start: 2024-07-19

## 2024-07-19 RX ORDER — ALBUTEROL SULFATE 90 UG/1
2 AEROSOL, METERED RESPIRATORY (INHALATION) EVERY 6 HOURS PRN
Qty: 18 G | Refills: 0 | Status: SHIPPED | OUTPATIENT
Start: 2024-07-19

## 2024-07-19 RX ORDER — FLUCONAZOLE 150 MG/1
TABLET ORAL
COMMUNITY
Start: 2024-06-16

## 2024-07-19 NOTE — PROGRESS NOTES
Assessment & Plan      Diagnosis Comments   1. Viral URI with cough  Pending strep culture and covid         2. Fever and chills  Symptomatic COVID-19 Virus (Coronavirus) by PCR Nose, Streptococcus A Rapid Screen w/Reflex to PCR - Clinic Collect, Influenza A/B antigen, Group A Streptococcus PCR Throat Swab, XR Chest 2 Views       3. Acute cough  Symptomatic COVID-19 Virus (Coronavirus) by PCR Nose, Streptococcus A Rapid Screen w/Reflex to PCR - Clinic Collect, Influenza A/B antigen, Group A Streptococcus PCR Throat Swab, XR Chest 2 Views, albuterol (PROAIR HFA/PROVENTIL HFA/VENTOLIN HFA) 108 (90 Base) MCG/ACT inhaler, predniSONE (DELTASONE) 20 MG tablet       4. Congestion of paranasal sinus  Symptomatic COVID-19 Virus (Coronavirus) by PCR Nose, Streptococcus A Rapid Screen w/Reflex to PCR - Clinic Collect, Influenza A/B antigen, Group A Streptococcus PCR Throat Swab       5. SOB (shortness of breath)  XR Chest 2 Views, albuterol (PROAIR HFA/PROVENTIL HFA/VENTOLIN HFA) 108 (90 Base) MCG/ACT inhaler, predniSONE (DELTASONE) 20 MG tablet       Albuterol for SOB and cough, prednisone for symptoms related to bronchitis.   Home care reviewed. Patient verbalized understanding; will monitor symptoms closely. Reviewed s/e to medications.   Follow up with primary care in 1 week if symptoms not improving.     Handout given from epic and reviewed.  Will self isolate per cdc guidelines      TIMOTHY [facility] SHILO Nelson is a 29 year old female who presents to clinic today for the following health issues:  Chief Complaint   Patient presents with    Urgent Care     Pt present with, chills, low grade fever, post nasal drip, swollen lymph node, pain when breathing, cough, onset 3 days.   Pt tested negative for covid, strep and flu yesterday, Pt thinks it might be bronchitis.        HPI      URI Adult    Onset of symptoms was 3 day(s) ago.  Course of illness is waxing and  waning.    Severity moderate  Current and Associated symptoms: fever, runny nose, cough - non-productive, shortness of breath, sore throat, headache, body aches, and fatigue  Treatment measures tried include Tylenol/Ibuprofen, Fluids, and Rest.  Predisposing factors include None.      Review of Systems  Constitutional, HEENT, cardiovascular, pulmonary, gi and gu systems are negative, except as otherwise noted.      Objective    /87   Pulse 67   Temp (!) 100.9  F (38.3  C) (Tympanic)   Resp 22   Wt 71.2 kg (157 lb)   SpO2 96%   BMI 25.34 kg/m    Physical Exam   GENERAL: alert, no distress, and fatigued  EYES: Eyes grossly normal to inspection, PERRL and conjunctivae and sclerae normal  HENT: normal cephalic/atraumatic, ear canals and TM's normal, nose and mouth without ulcers or lesions, nasal mucosa edematous , rhinorrhea clear, oropharynx clear, oral mucous membranes moist, and tonsillar erythema  NECK: bilateral anterior cervical adenopathy, no asymmetry, masses, or scars, and thyroid normal to palpation  RESP: bronchial breath sounds upper crackles right mid  CV: regular rate and rhythm, normal S1 S2, no S3 or S4, no murmur, click or rub, no peripheral edema  ABDOMEN: soft, nontender, no hepatosplenomegaly, no masses and bowel sounds normal  MS: no gross musculoskeletal defects noted, no edema  SKIN: no suspicious lesions or rashes    Results for orders placed or performed in visit on 07/19/24   XR Chest 2 Views     Status: None    Narrative    CHEST TWO VIEWS 7/19/2024 4:15 PM     HISTORY: SOB (shortness of breath); Fever and chills; Acute cough    COMPARISON: 2/21/2019       Impression    IMPRESSION: No acute cardiopulmonary abnormality.    GIBRAN DEE MD         SYSTEM ID:  [id number]   Streptococcus A Rapid Screen w/Reflex to PCR - Clinic Collect     Status: Normal    Specimen: Throat; Swab   Result Value Ref [place]    Group A Strep antigen Negative Negative   Influenza A/B antigen     Status:  Normal    Specimen: Nose; Swab   Result Value Ref [place]    Influenza A antigen Negative Negative    Influenza B antigen Negative Negative    Narrative    Test results must be correlated with clinical data. If necessary, results should be confirmed by a molecular assay or viral culture.

## 2024-07-20 ENCOUNTER — TELEPHONE (OUTPATIENT)
Dept: NURSING | Facility: CLINIC | Age: 29
End: 2024-07-20
Payer: COMMERCIAL

## 2024-07-20 NOTE — TELEPHONE ENCOUNTER
Coronavirus (COVID-19) Notification    Caller Name (Patient, parent, daughter/son, grandparent, etc)  Patient    Reason for call  Notify of Positive Coronavirus (COVID-19) lab results, assess symptoms,  review [facility] recommendations    Lab Result    Lab test:  2019-nCoV rRt-PCR or SARS-CoV-2 PCR    Oropharyngeal AND/OR nasopharyngeal swabs is POSITIVE for 2019-nCoV RNA/SARS-COV-2 PCR (COVID-19 virus)      Gather patient reported symptoms   Assessment   Current Symptoms at time of phone call, reported by patient: (if no symptoms, document: No symptoms] Yes   Date of symptom(s) onset (if applicable) 07/17/24     If at time of call, Patients symptoms have worsened, the Patient should contact 911 or have someone drive them to Emergency Dept promptly:    If Patient calling 911, inform 911 personal that you have tested positive for the Coronavirus (COVID-19).  Place mask on and await 911 to arrive.  If Emergency Dept, If possible, please have another adult drive you to the Emergency Dept but you need to wear mask when in contact with other people.      Treatment Options:   Is patient interested in discussing COVID treatment? Yes.   Is this a [place] or [place] Patient? No:     Are you an agent trained to scheduling? Yes.     Review information with Patient    Your result was positive. This means you have COVID-19 (coronavirus).    How can I protect others?    These guidelines are for isolating before returning to work, school or .    If you DO have symptoms  Stay home and away from others   For at least 5 days after your symptoms started, AND  You are fever free for 24 hours (with no medicine that reduces fever), AND  Your other symptoms are better  Wear a mask for 10 full days anytime you are around others    If you DON'T have symptoms  Stay home and away from others for at least 5 days after your positive test  Wear a mask for 10 full days anytime you are around others    There may be different  guidelines for healthcare facilities.  Please check with the specific sites before arriving.    If you have been told by a doctor that you were severely ill with COVID-19 or are immunocompromised, you should isolate for at least 10 days.    You should not go back to work until you meet the guidelines above for ending your home isolation. You don't need to be retested for COVID-19 before going back to work--studies show that you won't spread the virus if it's been at least 10 days since your symptoms started (or 20 days, if you have a weak immune system).    Employers, schools, and daycares: This is an official notice for this person's medical guidelines for returning in-person.  They must meet the above guidelines before going back to work, school or  in person.    You will receive a positive COVID-19 letter via [employer] or the mail soon with additional self-care information.    Would you like me to review some of that information with you now?  No    If you were tested for an upcoming procedure, please contact your provider for next steps.    Kaitlyn Rogers

## 2024-07-22 ENCOUNTER — VIRTUAL VISIT (OUTPATIENT)
Dept: URGENT CARE | Facility: CLINIC | Age: 29
End: 2024-07-22
Payer: COMMERCIAL

## 2024-07-22 DIAGNOSIS — U07.1 COVID-19: Primary | ICD-10-CM

## 2024-07-22 PROCEDURE — 99213 OFFICE O/P EST LOW 20 MIN: CPT | Mod: 95

## 2024-07-22 RX ORDER — BENZONATATE 200 MG/1
200 CAPSULE ORAL 3 TIMES DAILY PRN
Qty: 30 CAPSULE | Refills: 0 | Status: SHIPPED | OUTPATIENT
Start: 2024-07-22

## 2024-07-22 NOTE — PROGRESS NOTES
Subjective   HPI    Not pregnant or breastfeeding  Covid symptoms started chills and sore throat  6 days ago  Little cough   Felt very tired  Feels a bit better  Now having a productive cough  Still having fatigued     Hasn't had the booster    Patient Active Problem List   Diagnosis    Hashimoto's thyroiditis    Thyroid nodule    Indication for care in labor or delivery    Normal delivery     Past Surgical History:   Procedure Laterality Date    BACK SURGERY  4/7/22    Disc replacement surgery in neck-  herniated disc    ORTHOPEDIC SURGERY      excision of cervical intervertebral disc       Social History     Tobacco Use    Smoking status: Never    Smokeless tobacco: Never   Substance Use Topics    Alcohol use: No     Family History   Problem Relation Age of Onset    Hypertension Mother            Reviewed and updated as needed this visit by Provider    Allergies                Review of Systems   Constitutional, HEENT, cardiovascular, pulmonary, gi and gu systems are negative, except as otherwise noted.       Objective   Reported vitals:  There were no vitals taken for this visit.   healthy, alert, and no distress  PSYCH: Alert and oriented times 3; coherent speech, normal   rate and volume, able to articulate logical thoughts, able   to abstract reason, no tangential thoughts, no hallucinations   or delusions  Her affect is normal  RESP: No cough, no audible wheezing, able to talk in full sentences  Additional exam:  none  Remainder of exam unable to be completed due to telephone visits    Diagnostic Test Results:  Labs reviewed in Epic  none         Assessment/Plan:      ICD-10-CM    1. COVID-19  U07.1 benzonatate (TESSALON) 200 MG capsule        Persistent cough  Past the point for paxlovid AND patient has no known underlying condition that would qualify her   If with any symptoms of chest pain or shortness of breath, lightheadedness, palpitations, feeling like passing out or change and worsening in the  quality of your symptoms, please proceed to ER. Recommend follow up with PCP if persistent concerns  Prescribed with tessalon for symptom management    Video done via:  [employer]  Originating site: patient home   Provider location: provider home   Joined the call at 7/22/2024, 1:39:06 pm.  Left the call at 7/22/2024, 1:49:24 pm.  You were on the call for 10 minutes 18 seconds      Lucía Tobar MD

## 2024-07-22 NOTE — LETTER
July 22, 2024      Omar Chatterjee  [address] [address]        To Whom It May Concern:    Omar Chatterjee  was notified covid positive 7/20/24 in the afternoon.  Recommend patient to stay home until symptoms have improved for 24 hours or per healthcare worker protocol.       Sincerely,        [facility]  Lucía Tobar M.D.

## 2024-09-26 NOTE — LETTER
August 21, 2022      To Whom It May Concern:      Omar Chatterjee was seen in our Emergency Department today, 08/21/22.  Please excuse her from work on 8/22/22.    Sincerely,        Liz Gordillo MD        
2 seconds or less
2 seconds or less

## 2024-10-21 SDOH — HEALTH STABILITY: PHYSICAL HEALTH: ON AVERAGE, HOW MANY MINUTES DO YOU ENGAGE IN EXERCISE AT THIS LEVEL?: 60 MIN

## 2024-10-21 SDOH — HEALTH STABILITY: PHYSICAL HEALTH: ON AVERAGE, HOW MANY DAYS PER WEEK DO YOU ENGAGE IN MODERATE TO STRENUOUS EXERCISE (LIKE A BRISK WALK)?: 7 DAYS

## 2024-10-21 ASSESSMENT — SOCIAL DETERMINANTS OF HEALTH (SDOH): HOW OFTEN DO YOU GET TOGETHER WITH FRIENDS OR RELATIVES?: THREE TIMES A WEEK

## 2024-10-24 ENCOUNTER — OFFICE VISIT (OUTPATIENT)
Dept: FAMILY MEDICINE | Facility: CLINIC | Age: 29
End: 2024-10-24
Attending: INTERNAL MEDICINE
Payer: COMMERCIAL

## 2024-10-24 VITALS
SYSTOLIC BLOOD PRESSURE: 115 MMHG | BODY MASS INDEX: 26.37 KG/M2 | HEIGHT: 66 IN | WEIGHT: 164.1 LBS | HEART RATE: 78 BPM | TEMPERATURE: 98 F | OXYGEN SATURATION: 100 % | RESPIRATION RATE: 14 BRPM | DIASTOLIC BLOOD PRESSURE: 77 MMHG

## 2024-10-24 DIAGNOSIS — E04.1 THYROID NODULE: ICD-10-CM

## 2024-10-24 DIAGNOSIS — E61.1 IRON DEFICIENCY: ICD-10-CM

## 2024-10-24 DIAGNOSIS — Z12.4 CERVICAL CANCER SCREENING: ICD-10-CM

## 2024-10-24 DIAGNOSIS — N64.4 BREAST PAIN: ICD-10-CM

## 2024-10-24 DIAGNOSIS — Z30.41 ENCOUNTER FOR SURVEILLANCE OF CONTRACEPTIVE PILLS: ICD-10-CM

## 2024-10-24 DIAGNOSIS — Z13.220 SCREENING FOR LIPID DISORDERS: ICD-10-CM

## 2024-10-24 DIAGNOSIS — L65.9 HAIR LOSS: ICD-10-CM

## 2024-10-24 DIAGNOSIS — E06.3 HASHIMOTO'S THYROIDITIS: ICD-10-CM

## 2024-10-24 DIAGNOSIS — Z00.00 ROUTINE GENERAL MEDICAL EXAMINATION AT A HEALTH CARE FACILITY: Primary | ICD-10-CM

## 2024-10-24 LAB
ALBUMIN SERPL BCG-MCNC: 4.2 G/DL (ref 3.5–5.2)
ALP SERPL-CCNC: 45 U/L (ref 40–150)
ALT SERPL W P-5'-P-CCNC: 12 U/L (ref 0–50)
ANION GAP SERPL CALCULATED.3IONS-SCNC: 11 MMOL/L (ref 7–15)
AST SERPL W P-5'-P-CCNC: 16 U/L (ref 0–45)
BILIRUB SERPL-MCNC: 0.2 MG/DL
BUN SERPL-MCNC: 12.7 MG/DL (ref 6–20)
CALCIUM SERPL-MCNC: 8.9 MG/DL (ref 8.8–10.4)
CHLORIDE SERPL-SCNC: 105 MMOL/L (ref 98–107)
CHOLEST SERPL-MCNC: 189 MG/DL
CREAT SERPL-MCNC: 0.68 MG/DL (ref 0.51–0.95)
EGFRCR SERPLBLD CKD-EPI 2021: >90 ML/MIN/1.73M2
FASTING STATUS PATIENT QL REPORTED: NO
FASTING STATUS PATIENT QL REPORTED: NO
FERRITIN SERPL-MCNC: 10 NG/ML (ref 6–175)
GLUCOSE SERPL-MCNC: 79 MG/DL (ref 70–99)
HCO3 SERPL-SCNC: 23 MMOL/L (ref 22–29)
HDLC SERPL-MCNC: 68 MG/DL
HGB BLD-MCNC: 12.8 G/DL (ref 11.7–15.7)
LDLC SERPL CALC-MCNC: 98 MG/DL
NONHDLC SERPL-MCNC: 121 MG/DL
POTASSIUM SERPL-SCNC: 4 MMOL/L (ref 3.4–5.3)
PROT SERPL-MCNC: 7.4 G/DL (ref 6.4–8.3)
SODIUM SERPL-SCNC: 139 MMOL/L (ref 135–145)
TRIGL SERPL-MCNC: 113 MG/DL
TSH SERPL DL<=0.005 MIU/L-ACNC: 2.01 UIU/ML (ref 0.3–4.2)
VIT D+METAB SERPL-MCNC: 17 NG/ML (ref 20–50)

## 2024-10-24 PROCEDURE — 99395 PREV VISIT EST AGE 18-39: CPT | Performed by: INTERNAL MEDICINE

## 2024-10-24 PROCEDURE — 82728 ASSAY OF FERRITIN: CPT | Performed by: INTERNAL MEDICINE

## 2024-10-24 PROCEDURE — 80053 COMPREHEN METABOLIC PANEL: CPT | Performed by: INTERNAL MEDICINE

## 2024-10-24 PROCEDURE — 99214 OFFICE O/P EST MOD 30 MIN: CPT | Mod: 25 | Performed by: INTERNAL MEDICINE

## 2024-10-24 PROCEDURE — 36415 COLL VENOUS BLD VENIPUNCTURE: CPT | Performed by: INTERNAL MEDICINE

## 2024-10-24 PROCEDURE — 82306 VITAMIN D 25 HYDROXY: CPT | Performed by: INTERNAL MEDICINE

## 2024-10-24 PROCEDURE — 80061 LIPID PANEL: CPT | Performed by: INTERNAL MEDICINE

## 2024-10-24 PROCEDURE — 85018 HEMOGLOBIN: CPT | Performed by: INTERNAL MEDICINE

## 2024-10-24 PROCEDURE — 86364 TISS TRNSGLTMNASE EA IG CLAS: CPT | Performed by: INTERNAL MEDICINE

## 2024-10-24 PROCEDURE — 84443 ASSAY THYROID STIM HORMONE: CPT | Performed by: INTERNAL MEDICINE

## 2024-10-24 RX ORDER — NORGESTIMATE AND ETHINYL ESTRADIOL 7DAYSX3 LO
1 KIT ORAL DAILY
Qty: 90 TABLET | Refills: 3 | Status: SHIPPED | OUTPATIENT
Start: 2024-10-24

## 2024-10-24 ASSESSMENT — PAIN SCALES - GENERAL: PAINLEVEL_OUTOF10: NO PAIN (0)

## 2024-10-24 NOTE — Clinical Note
Patient had pap test done approximately on 1/1/2022 at [facility] and it was normal

## 2024-10-24 NOTE — PROGRESS NOTES
Preventive Care Visit  [facility] SHILO  Scarlett Siddiqui MD, Internal Medicine  Oct 24, 2024      Omar was seen today for physical.    Diagnoses and all orders for this visit:    Routine general medical examination at a health care facility  Counseled on diet and exercise.   Declined Covid booster today.  Reviewed weight flow charts.  Reviewed 7/2024 lab results.    Cervical cancer screening  Last pap 1/2022 was nil/neg, schedule to repeat 1/2025 with her OBGYN provider.  Follows [facility], asked patient to have them send me her records and I gave her my business card.    Thyroid nodule  Will continue to monitor.    Hashimoto's thyroiditis  -     TSH with free T4 reflex; Future  -     TSH with free T4 reflex  Takes levothyroxine.    Iron deficiency  -     Ferritin; Future  -     Hemoglobin; Future  -     Tissue transglutaminase morena IgA and IgG; Future  -     Ferritin  -     Hemoglobin  -     Tissue transglutaminase morena IgA and IgG  Takes iron supplements with orange juice and tolerates them well.  Reported occasional bloating, otherwise no GI sx.    Screening for lipid disorders  -     Lipid panel reflex to direct LDL Fasting; Future  -     Lipid panel reflex to direct LDL Fasting    Encounter for surveillance of contraceptive pills  -     norgestim-eth estrad triphasic (ORTHO TRI-CYCLEN LO) 0.18/0.215/0.25 MG-25 MCG tablet; Take 1 tablet by mouth daily.  Takes Ortho Tri-cyclen, reported regular menses.  Occasionally flow is heavy, but not always.  This is from her gynecologist but she requested me to refill     Hair loss  -     Vitamin D Deficiency; Future  -     Comprehensive metabolic panel (BMP + Alb, Alk Phos, ALT, AST, Total. Bili, TP); Future  -     Vitamin D Deficiency  -     Comprehensive metabolic panel (BMP + Alb, Alk Phos, ALT, AST, Total. Bili, TP)  Reported worsening hair loss, last time she was here she reported it, but she was 7 months post-partum.  Advised anemia and iron deficiency  can cause hair loss.  Discussed expected post partum sx.  No longer breast feeding, stopped about 7 months ago.  Recommended taking a pre-nubia vitamin.  Recommended taking calcium and vitamin D supplements.    Breast pain  -     MA Diagnostic Bilateral w/ [place]; Future  -     US Breast Right Limited 1-3 Quadrants; Future  Reported breast pain that presented about 2 months ago that worsens when she holds her daughter or bends over.  Discussed completing a diagnostic mammo and patient agree.  Diagnostic mammo ordered, patient will complete at her earliest convenience.  Advised OCPs can cause breast tenderness.    Other orders  -     PRIMARY CARE FOLLOW-UP SCHEDULING  -     REVIEW OF HEALTH MAINTENANCE PROTOCOL ORDERS  I did not discuss follow up ultrasound of thyroid nodule   So sent her message about that     Subjective   Omar is a 29 year old, presenting for the following:  Physical     HPI  Omar is a 29 year old, presenting for the following:  Physical    Health Care Directive  Patient does not have a Health Care Directive: Discussed advance care planning with patient; however, patient declined at this time.      10/21/2024   General Health   How would you rate your overall physical health? (!) FAIR   Feel stress (tense, anxious, or unable to sleep) Only a little      (!) STRESS CONCERN      10/21/2024   Nutrition   Three or more servings of calcium each day? Yes   Diet: Regular (no restrictions)   How many servings of fruit and vegetables per day? 4 or more   How many sweetened beverages each day? 0-1            10/21/2024   Exercise   Days per week of moderate/strenous exercise 7 days   Average minutes spent exercising at this level 60 min            10/21/2024   Social Factors   Frequency of gathering with friends or relatives Three times a week   Worry food won't last until get money to buy more No   Food not last or not have enough money for food? No   Do you have housing? (Housing is defined as stable  permanent housing and does not include staying ouside in a car, in a tent, in an abandoned building, in an overnight shelter, or couch-surfing.) No   Are you worried about losing your housing? No   Lack of transportation? No   Unable to get utilities (heat,electricity)? No   Want help with housing or utility concern? No      (!) HOUSING CONCERN PRESENT      10/21/2024   Dental   Dentist two times every year? Yes            10/21/2024   TB Screening   Were you born outside of the [place]? Yes            Today's PHQ-2 Score:       10/23/2024    12:41 PM   PHQ-2 ( 1999 Pf[employer])   Q1: Little interest or pleasure in doing things 0    Q2: Feeling down, depressed or hopeless 0    PHQ-2 Score 0    Q1: Little interest or pleasure in doing things Not at all   Q2: Feeling down, depressed or hopeless Not at all   PHQ-2 Score 0       Patient-reported           10/21/2024   Substance Use   Alcohol more than 3/day or more than 7/wk No   Do you use any other substances recreationally? No        Social History     Tobacco Use    Smoking status: Never    Smokeless tobacco: Never   Vaping Use    Vaping status: Never Used   Substance Use Topics    Alcohol use: No    Drug use: No             10/21/2024   Breast Cancer Screening   Family history of breast, colon, or ovarian cancer? No / Unknown         Mammogram Screening - Patient under 40 years of age: Routine Mammogram Screening not recommended.         10/21/2024   STI Screening   New sexual partner(s) since last STI/HIV test? (!) YES       History of abnormal Pap smear: No - age 30-64 HPV with reflex Pap every 5 years recommended         10/21/2024   Contraception/Family Planning   Questions about contraception or family planning No         Reviewed and updated as needed this visit by Provider                  Review of Systems  Constitutional, HEENT, cardiovascular, pulmonary, GI, , musculoskeletal, neuro, skin, endocrine and psych systems are negative, except as otherwise noted.    "  Objective    Exam  /77 (BP Location: Right arm, Patient Position: Sitting, Cuff Size: Adult Regular)   Pulse 78   Temp 98  F (36.7  C) (Oral)   Resp 14   Ht 1.67 m (5' 5.75\")   Wt 74.4 kg (164 lb 1.6 oz)   SpO2 100%   BMI 26.69 kg/m     Estimated body mass index is 26.69 kg/m  as calculated from the following:    Height as of this encounter: 1.67 m (5' 5.75\").    Weight as of this encounter: 74.4 kg (164 lb 1.6 oz).    Physical Exam  GENERAL: alert and no distress  EYES: Eyes grossly normal to inspection, PERRL and conjunctivae and sclerae normal  HENT: ear canals and TM's normal, nose and mouth without ulcers or lesions  NECK: no adenopathy, no asymmetry, masses, or scars, she has a slightly enlarged thyroid.  RESP: lungs clear to auscultation - no rales, rhonchi or wheezes  BREAST: normal without masses, tenderness or nipple discharge and no palpable axillary masses or adenopathy her right breast is slightly larger than her left breast and she has bilateral lumpy breast  No abnormal masses felt  CV: regular rate and rhythm, normal S1 S2, no S3 or S4, no murmur, click or rub, no peripheral edema  ABDOMEN: soft, nontender, no hepatosplenomegaly, no masses and bowel sounds normal  MS: no gross musculoskeletal defects noted, no edema  SKIN: no suspicious lesions or rashes she has a tattoo on her lateral left chest  NEURO: Normal strength and tone, mentation intact and speech normal  PSYCH: mentation appears normal, affect normal/bright  LYMPH: no cervical, supraclavicular, axillary, or inguinal adenopathy    Labs pending.    Signed Electronically by: Scarlett Siddiqui MD    This document serves as a record of the services and decisions personally performed and made by Dr. Siddiqui. It was created on her behalf by Elida Worley, a trained medical scribe. The creation of this document is based the provider's statements to the medical scribe.       "

## 2024-10-24 NOTE — PATIENT INSTRUCTIONS
Labs today    diagnostic mammogram and ultrasound is ordered   Please call the following number to make appointment :  [phone]  It is located in [address]    Follow up in one year for physical   Seek sooner medical attention if there is any worsening of symptoms or problems.     Patient Education   Preventive Care Advice   This is general advice given by our system to help you stay healthy. However, your care team may have specific advice just for you. Please talk to your care team about your preventive care needs.  Nutrition  Eat 5 or more servings of fruits and vegetables each day.  Try wheat bread, brown rice and whole grain pasta (instead of white bread, rice, and pasta).  Get enough calcium and vitamin D. Check the label on foods and aim for 100% of the RDA (recommended daily allowance).  Lifestyle  Exercise at least 150 minutes each week  (30 minutes a day, 5 days a week).  Do muscle strengthening activities 2 days a week. These help control your weight and prevent disease.  No smoking.  Wear sunscreen to prevent skin cancer.  Have a dental exam and cleaning every 6 months.  Yearly exams  See your health care team every year to talk about:  Any changes in your health.  Any medicines your care team has prescribed.  Preventive care, family planning, and ways to prevent chronic diseases.  Shots (vaccines)   HPV shots (up to age 26), if you've never had them before.  Hepatitis B shots (up to age 59), if you've never had them before.  COVID-19 shot: Get this shot when it's due.  Flu shot: Get a flu shot every year.  Tetanus shot: Get a tetanus shot every 10 years.  Pneumococcal, hepatitis A, and RSV shots: Ask your care team if you need these based on your risk.  Shingles shot (for age 50 and up)  General health tests  Diabetes screening:  Starting at age 35, Get screened for diabetes at least every 3 years.  If you are younger than age 35, ask your care team if you should be screened for  diabetes.  Cholesterol test: At age 39, start having a cholesterol test every 5 years, or more often if advised.  Bone density scan (DEXA): At age 50, ask your care team if you should have this scan for osteoporosis (brittle bones).  Hepatitis C: Get tested at least once in your life.  STIs (sexually transmitted infections)  Before age 24: Ask your care team if you should be screened for STIs.  After age 24: Get screened for STIs if you're at risk. You are at risk for STIs (including HIV) if:  You are sexually active with more than one person.  You don't use condoms every time.  You or a partner was diagnosed with a sexually transmitted infection.  If you are at risk for HIV, ask about PrEP medicine to prevent HIV.  Get tested for HIV at least once in your life, whether you are at risk for HIV or not.  Cancer screening tests  Cervical cancer screening: If you have a cervix, begin getting regular cervical cancer screening tests starting at age 21.  Breast cancer scan (mammogram): If you've ever had breasts, begin having regular mammograms starting at age 40. This is a scan to check for breast cancer.  Colon cancer screening: It is important to start screening for colon cancer at age 45.  Have a colonoscopy test every 10 years (or more often if you're at risk) Or, ask your provider about stool tests like a FIT test every year or Cologuard test every 3 years.  To learn more about your testing options, visit:   .  For help making a decision, visit:   https://bit.ly/xq85531.  Prostate cancer screening test: If you have a prostate, ask your care team if a prostate cancer screening test (PSA) at age 55 is right for you.  Lung cancer screening: If you are a current or former smoker ages 50 to 80, ask your care team if ongoing lung cancer screenings are right for you.  For informational purposes only. Not to replace the advice of your health care provider. Copyright   2023 [employer]. All rights reserved.  Clinically reviewed by the [facility] Transitions Program. [facility] [id number] - REV 01/24.  Safer Sex: Care Instructions  Overview  Safer sex is a way to reduce your risk of getting a sexually transmitted infection (STI). It can also help prevent pregnancy.  Several products can help you practice safer sex and reduce your chance of STIs. One of the best is a condom. There are internal and external condoms. You can use a special rubber sheet (dental dam) for protection during oral sex. Disposable gloves can keep your hands from touching blood, semen, or other body fluids that can carry infections.  Remember that birth control methods such as diaphragms, IUDs, foams, and birth control pills do not stop you from getting STIs.  Follow-up care is a key part of your treatment and safety. Be sure to make and go to all appointments, and call your doctor if you are having problems. It's also a good idea to know your test results and keep a list of the medicines you take.  How can you care for yourself at home?  Think about getting vaccinated to help prevent hepatitis A, hepatitis B, and human papillomavirus (HPV). They can be spread through sex.  Use a condom every time you have sex. Use an external condom, which goes on the penis. Or use an internal condom, which goes into the vagina or anus.  Make sure you use the right size external condom. A condom that's too small can break easily. A condom that's too big can slip off during sex.  Use a new condom each time you have sex. Be careful not to poke a hole in the condom when you open the wrapper.  Don't use an internal condom and an external condom at the same time.  Never use petroleum jelly (such as Vaseline), grease, hand lotion, baby oil, or anything with oil in it. These products can make holes in the condom.  After intercourse, hold the edge of the condom as you remove it. This will help keep semen from spilling out of the condom.  Do not have sex with anyone who  "has symptoms of an STI, such as sores on the genitals or mouth.  Do not drink a lot of alcohol or use drugs before sex.  Limit your sex partners. Sex with one partner who has sex only with you can reduce your risk of getting an STI.  Don't share sex toys. But if you do share them, use a condom and clean the sex toys between each use.  Talk to any partners before you have sex. Talk about what you feel comfortable with and whether you have any boundaries with sex. And find out if your partner or partners may be at risk for any STI. Keep in mind that a person may be able to spread an STI even if they do not have symptoms. You and any partners may want to get tested for STIs.  Where can you learn more?  Go to https://www."[employer]net/patiented  Enter B608 in the search box to learn more about \"Safer Sex: Care Instructions.\"  Current as of: November 27, 2023  Content Version: 14.2 2024 [facility] [employer] [facility].   Care instructions adapted under license by your healthcare professional. If you have questions about a medical condition or this instruction, always ask your healthcare professional. [employer] disclaims any warranty or liability for your use of this information.       "

## 2024-10-25 LAB
TTG IGA SER-ACNC: <0.2 U/ML
TTG IGG SER-ACNC: <0.6 U/ML

## 2024-10-26 DIAGNOSIS — E61.1 IRON DEFICIENCY: Primary | ICD-10-CM

## 2024-10-26 DIAGNOSIS — R79.89 LOW VITAMIN D LEVEL: ICD-10-CM

## 2024-11-16 ENCOUNTER — OFFICE VISIT (OUTPATIENT)
Dept: URGENT CARE | Facility: URGENT CARE | Age: 29
End: 2024-11-16
Payer: COMMERCIAL

## 2024-11-16 ENCOUNTER — ANCILLARY PROCEDURE (OUTPATIENT)
Dept: GENERAL RADIOLOGY | Facility: CLINIC | Age: 29
End: 2024-11-16
Payer: COMMERCIAL

## 2024-11-16 VITALS
HEART RATE: 105 BPM | OXYGEN SATURATION: 99 % | SYSTOLIC BLOOD PRESSURE: 130 MMHG | DIASTOLIC BLOOD PRESSURE: 80 MMHG | RESPIRATION RATE: 14 BRPM | TEMPERATURE: 99.5 F

## 2024-11-16 DIAGNOSIS — R06.2 WHEEZING: ICD-10-CM

## 2024-11-16 DIAGNOSIS — R07.0 THROAT PAIN: ICD-10-CM

## 2024-11-16 DIAGNOSIS — R50.9 FEVER IN ADULT: ICD-10-CM

## 2024-11-16 DIAGNOSIS — Z11.52 ENCOUNTER FOR SCREENING FOR COVID-19: ICD-10-CM

## 2024-11-16 DIAGNOSIS — R50.9 FEVER IN ADULT: Primary | ICD-10-CM

## 2024-11-16 DIAGNOSIS — R05.1 ACUTE COUGH: ICD-10-CM

## 2024-11-16 LAB
DEPRECATED S PYO AG THROAT QL EIA: NEGATIVE
FLUAV AG SPEC QL IA: NEGATIVE
FLUBV AG SPEC QL IA: NEGATIVE
GROUP A STREP BY PCR: NOT DETECTED
SARS-COV-2 RNA RESP QL NAA+PROBE: NEGATIVE

## 2024-11-16 PROCEDURE — 87651 STREP A DNA AMP PROBE: CPT

## 2024-11-16 PROCEDURE — 87804 INFLUENZA ASSAY W/OPTIC: CPT

## 2024-11-16 PROCEDURE — 71046 X-RAY EXAM CHEST 2 VIEWS: CPT | Mod: TC | Performed by: RADIOLOGY

## 2024-11-16 PROCEDURE — 87635 SARS-COV-2 COVID-19 AMP PRB: CPT

## 2024-11-16 PROCEDURE — 99214 OFFICE O/P EST MOD 30 MIN: CPT

## 2024-11-16 RX ORDER — PNV NO.95/FERROUS FUM/FOLIC AC 28MG-0.8MG
1 TABLET ORAL DAILY
COMMUNITY

## 2024-11-16 RX ORDER — BENZONATATE 100 MG/1
100 CAPSULE ORAL 3 TIMES DAILY PRN
Qty: 24 CAPSULE | Refills: 0 | Status: SHIPPED | OUTPATIENT
Start: 2024-11-16 | End: 2024-11-26

## 2024-11-16 RX ORDER — ALBUTEROL SULFATE 90 UG/1
2 INHALANT RESPIRATORY (INHALATION) EVERY 6 HOURS PRN
Qty: 18 G | Refills: 0 | Status: SHIPPED | OUTPATIENT
Start: 2024-11-16

## 2024-11-16 ASSESSMENT — ENCOUNTER SYMPTOMS
PALPITATIONS: 0
WHEEZING: 1
VOICE CHANGE: 1
VOMITING: 0
DIFFICULTY URINATING: 0
FEVER: 1
COUGH: 1
FATIGUE: 0
HEADACHES: 0
LIGHT-HEADEDNESS: 0
NAUSEA: 0
SORE THROAT: 1
SHORTNESS OF BREATH: 0
CHILLS: 0
DIARRHEA: 0
DYSURIA: 0
DIZZINESS: 0
ABDOMINAL PAIN: 0
ARTHRALGIAS: 0

## 2024-11-16 NOTE — PATIENT INSTRUCTIONS
1) Increase fluids and rest  2) Try Mucinex and Neti pot over the counter for congestion  3) Continue taking Tylenol/Ibuprofen for fever/pain relief as needed.  4) Salt water gargles and lozenges can be helpful for throat relief  5) Honey may be helpful for your cough  6) You will only be notified of the confirmatory strep results if they are positive.      I have also prescribed albuterol for your wheezing and the benzonatate for cough that you can take as needed. If you develop chest pain, shortness of breath, abdominal, nausea, vomiting, or worsening of symptoms, report promptly for further evaluation.

## 2024-11-16 NOTE — PROGRESS NOTES
Patient presents with:  Urgent Care  Flu: Since Thursday has been experiencing cough, sore throat, wheezing and chills, body aches and tachycardic.      Clinical Decision Making:      ICD-10-CM    1. Fever in adult  R50.9 Influenza A & B Antigen     XR Chest 2 Views      2. Throat pain  R07.0 Streptococcus A Rapid Screen w/Reflex to PCR     Group A Streptococcus PCR Throat Swab      3. Encounter for screening for COVID-19  Z11.52 COVID-19 Virus (Coronavirus) by PCR Nose      4. Wheezing  R06.2 albuterol (PROAIR HFA/PROVENTIL HFA/VENTOLIN HFA) 108 (90 Base) MCG/ACT inhaler      5. Acute cough  R05.1 benzonatate (TESSALON) 100 MG capsule        Chest x-ray negative for pneumonia.  Influenza and rapid strep test negative.  COVID and strep culture pending.  Patient well-appearing and in no signs of acute respiratory distress today in clinic.  She is slightly tachycardic at 105 bpm but reports she is not having any chest pain or palpitations, and heart rate regular upon palpation.  Also states she is not having any pleuritic chest pain, less likely PE.  Right now appears that illness is viral in etiology as it sounds that her daughter was recently sick and her symptoms have been improving, likely caught illness from daughter.  Treat with Mucinex, Tylenol/ibuprofen, salt water gargles, rest/fluids.  Prescribed albuterol inhaler as patient notes she has been having intermittent wheezing, lung sounds clear on physical exam today.  Benzonatate prescribed for cough. Patient instructions as below. Discussed red flag symptoms for when to return.       Patient Instructions   1) Increase fluids and rest  2) Try Mucinex and Neti pot over the counter for congestion  3) Continue taking Tylenol/Ibuprofen for fever/pain relief as needed.  4) Salt water gargles and lozenges can be helpful for throat relief  5) Honey may be helpful for your cough  6) You will only be notified of the confirmatory strep results if they are positive.       I have also prescribed albuterol for your wheezing and the benzonatate for cough that you can take as needed. If you develop chest pain, shortness of breath, abdominal, nausea, vomiting, or worsening of symptoms, report promptly for further evaluation.     HPI:  Omar Chatterjee is a 29 year old female who presents today with concerns of cough, congestion, sore throat x3 days.  Daughter was sick last week who tested negative for everything.  Patient started getting sick 3 to 4 days ago with similar symptoms.  Symptoms started with sore throat and congestion, yesterday she started losing her voice and got a low-grade fever of 99.  Does note a productive cough at times and feels like she has noted some wheezing.  Denies shortness of breath or chest pain.      History obtained from the patient.    Problem List:  2023-03: Indication for care in labor or delivery  2023-03: Normal delivery  2022-02: Hashimoto's thyroiditis  2022-02: Thyroid nodule      Past Medical History:   Diagnosis Date    Hashimoto's thyroiditis     Thyroid nodule     Uncomplicated asthma     Childhood Asthma       Social History     Tobacco Use    Smoking status: Never    Smokeless tobacco: Never   Substance Use Topics    Alcohol use: No         Review of Systems   Constitutional:  Positive for fever. Negative for chills and fatigue.   HENT:  Positive for sore throat and voice change. Negative for congestion and ear pain.    Eyes:  Negative for visual disturbance.   Respiratory:  Positive for cough and wheezing. Negative for shortness of breath.    Cardiovascular:  Negative for chest pain and palpitations.   Gastrointestinal:  Negative for abdominal pain, diarrhea, nausea and vomiting.   Genitourinary:  Negative for difficulty urinating and dysuria.   Musculoskeletal:  Negative for arthralgias.   Skin:  Negative for rash.   Neurological:  Negative for dizziness, light-headedness and headaches.       Vitals:    11/16/24 1606   BP: 130/80   BP  Location: Right arm   Patient Position: Sitting   Cuff Size: Adult Regular   Pulse: 105   Resp: 14   Temp: 99.5  F (37.5  C)   TempSrc: Tympanic   SpO2: 99%       Physical Exam  Constitutional:       General: She is not in acute distress.     Appearance: She is not diaphoretic.   HENT:      Head: Normocephalic and atraumatic.      Right Ear: Tympanic membrane and external ear normal.      Left Ear: Tympanic membrane and external ear normal.      Mouth/Throat:      Pharynx: Posterior oropharyngeal erythema present. No oropharyngeal exudate.   Eyes:      Conjunctiva/sclera: Conjunctivae normal.   Cardiovascular:      Rate and Rhythm: Normal rate and regular rhythm.      Heart sounds: Normal heart sounds. No murmur heard.  Pulmonary:      Effort: Pulmonary effort is normal. No respiratory distress.      Breath sounds: Normal breath sounds. No wheezing or rhonchi.   Abdominal:      Palpations: Abdomen is soft.      Tenderness: There is no abdominal tenderness.   Musculoskeletal:         General: Normal range of motion.   Skin:     General: Skin is warm.      Capillary Refill: Capillary refill takes less than 2 seconds.   Neurological:      General: No focal deficit present.      Mental Status: She is alert.   Psychiatric:         Mood and Affect: Mood normal.         Thought Content: Thought content normal.         Judgment: Judgment normal.         Results:  Results for orders placed or performed in visit on 11/16/24   XR Chest 2 Views     Status: None    Narrative    EXAM: XR CHEST 2 VIEWS  LOCATION: [facility]  DATE: 11/16/2024    INDICATION:  Fever in adult  COMPARISON: 7/19/2024      Impression    IMPRESSION: No pleural fluid or pneumothorax. No airspace disease or edema. Normal size of the heart.   Results for orders placed or performed in visit on 11/16/24   Streptococcus A Rapid Screen w/Reflex to PCR     Status: Normal    Specimen: Throat; Swab   Result Value Ref Range    Group A Strep  antigen Negative Negative   Influenza A & B Antigen     Status: Normal    Specimen: Nasopharyngeal; Swab   Result Value Ref Range    Influenza A antigen Negative Negative    Influenza B antigen Negative Negative    Narrative    Test results must be correlated with clinical data. If necessary, results should be confirmed by a molecular assay or viral culture.     I have personally ordered and preliminarily reviewed the following xray, I have noted no evidence of pneumonia.       At the end of the encounter, I discussed results, diagnosis, medications. Discussed red flags for immediate return to clinic/ER, as well as indications for follow up if no improvement. Patient understood and agreed to plan. Patient was stable for discharge.    TIMOTHY Marsh [facility] URGENT [facility]ARE

## 2024-11-20 DIAGNOSIS — E06.3 HASHIMOTO'S THYROIDITIS: ICD-10-CM

## 2024-11-21 RX ORDER — LEVOTHYROXINE SODIUM 50 UG/1
50 TABLET ORAL DAILY
Qty: 90 TABLET | Refills: 1 | Status: SHIPPED | OUTPATIENT
Start: 2024-11-21

## 2024-11-28 NOTE — ANESTHESIA PROCEDURE NOTES
"Epidural catheter Procedure Note    Pre-Procedure   Staff -        Anesthesiologist:  Bhupendra Lundy MD       Performed By: anesthesiologist       Location: OB       Pre-Anesthestic Checklist: patient identified, IV checked, risks and benefits discussed, informed consent, monitors and equipment checked, pre-op evaluation and at physician/surgeon's request  Timeout:       Correct Patient: Yes        Correct Procedure: Yes        Correct Site: Yes        Correct Position: Yes   Procedure Documentation  Procedure: epidural catheter       Patient Position: sitting       Patient Prep/Sterile Barriers: sterile gloves, mask, patient draped       Skin prep: Betadine       Local skin infiltrated with 3 mL of 1% lidocaine.        Insertion Site: L3-4. (midline approach).       Technique: LORT saline        HERLINDA at 6 cm.       Needle Type: [employer] needle       Needle Gauge: 17.        Needle Length (Inches): 3.5        Catheter: 19 G.          Catheter threaded easily.           Threaded 10 cm at skin.         # of attempts: 1 and  # of redirects:  1    Assessment/Narrative         Paresthesias: No.       Test dose of 3 mL lidocaine 1.5% w/ 1:200,000 epinephrine at 11:51 CDT.         Test dose negative, 3 minutes after injection, for signs of intravascular, subdural, or intrathecal injection.       Insertion/Infusion Method: LORT saline       Aspiration negative for Heme or CSF via Epidural Catheter.    Medication(s) Administered   0.2% Ropivacaine (Epidural) - EPIDURAL   10 mL - 3/13/2023 11:54:00 AM   Comments:  No complications   Secured with Tegaderm, adhesive spray and tape          FOR [facility] ONLY:   Pain Team Contact information: please page the Pain Team [employer]. Search \"Pain\". During daytime hours, please page the attending first. At night please page the resident first.    "
 delivery delivered

## 2024-12-31 ENCOUNTER — APPOINTMENT (OUTPATIENT)
Dept: LAB | Facility: CLINIC | Age: 29
End: 2024-12-31
Payer: COMMERCIAL

## 2025-02-01 ENCOUNTER — LAB (OUTPATIENT)
Dept: LAB | Facility: CLINIC | Age: 30
End: 2025-02-01
Payer: COMMERCIAL

## 2025-02-01 DIAGNOSIS — R79.89 LOW VITAMIN D LEVEL: ICD-10-CM

## 2025-02-01 DIAGNOSIS — E61.1 IRON DEFICIENCY: ICD-10-CM

## 2025-02-01 LAB
FERRITIN SERPL-MCNC: 29 NG/ML (ref 6–175)
HGB BLD-MCNC: 12.5 G/DL (ref 11.7–15.7)
VIT D+METAB SERPL-MCNC: 17 NG/ML (ref 20–50)

## 2025-02-01 PROCEDURE — 82728 ASSAY OF FERRITIN: CPT

## 2025-02-01 PROCEDURE — 36415 COLL VENOUS BLD VENIPUNCTURE: CPT

## 2025-02-01 PROCEDURE — 85018 HEMOGLOBIN: CPT

## 2025-02-01 PROCEDURE — 82306 VITAMIN D 25 HYDROXY: CPT

## 2025-02-01 NOTE — LETTER
February 5, 2025      Omar Chatterjee  [address]    [place] [address]        Dear ,    We are writing to inform you of your test results.    Cassandra Nelson     This is to inform you regarding your test result.     I tried to contact you but got the VM.  Your Vitamin D level is low.  Are you taking any vit D?  Recheck Vit D in 3-4 months.  Ferritin which is iron stores in the body is low.  We want Ferritin level greater than   Take OTC Ferrous Sulfate 325 mg po every other day if you tolerate.  Take with vit C as vit C helps to absorb iron.  Iron can make you constipated so take stool softener.  Recheck ferritin in 4 months                    Sincerely,        Dr.Nasima Artur MD,FACP    Resulted Orders   Ferritin   Result Value Ref Range    Ferritin 29 6 - 175 ng/mL   Hemoglobin   Result Value Ref Range    Hemoglobin 12.5 11.7 - 15.7 g/dL   Vitamin D Deficiency   Result Value Ref Range    Vitamin D, Total (25-Hydroxy) 17 (L) 20 - 50 ng/mL      Comment:      mild to moderate deficiency    Narrative    Season, race, dietary intake, and treatment affect the concentration of 25-hydroxy-Vitamin D. Values may decrease during winter months and increase during summer months.    Vitamin D determination is routinely performed by an immunoassay specific for 25 hydroxyvitamin D3.  If an individual is on vitamin D2(ergocalciferol) supplementation, please specify 25 OH vitamin D2 and D3 level determination by LCMSMS test [id number].

## 2025-02-04 NOTE — RESULT ENCOUNTER NOTE
Cassandra Nelson    This is to inform you regarding your test result.    I tried to contact you but got the VM.  Your Vitamin D level is low.  Are you taking any vit D?  Recheck Vit D in 3-4 months.  Ferritin which is iron stores in the body is low.  We want Ferritin level greater than   Take OTC Ferrous Sulfate 325 mg po every other day if you tolerate.  Take with vit C as vit C helps to absorb iron.  Iron can make you constipated so take stool softener.  Recheck ferritin in 4 months              Sincerely,      Dr.Nasima Artur MD,FACP

## 2025-02-05 NOTE — RESULT ENCOUNTER NOTE
Patient has not seen my chart message about her result   Please send her letter with copy or result   I tried few times to contact her but could not get hold of her

## 2025-05-27 ENCOUNTER — OFFICE VISIT (OUTPATIENT)
Dept: URGENT CARE | Facility: URGENT CARE | Age: 30
End: 2025-05-27
Payer: COMMERCIAL

## 2025-05-27 ENCOUNTER — ANCILLARY PROCEDURE (OUTPATIENT)
Dept: GENERAL RADIOLOGY | Facility: CLINIC | Age: 30
End: 2025-05-27
Attending: NURSE PRACTITIONER
Payer: COMMERCIAL

## 2025-05-27 VITALS
OXYGEN SATURATION: 96 % | RESPIRATION RATE: 18 BRPM | TEMPERATURE: 98.3 F | HEART RATE: 97 BPM | SYSTOLIC BLOOD PRESSURE: 134 MMHG | WEIGHT: 154 LBS | DIASTOLIC BLOOD PRESSURE: 91 MMHG | BODY MASS INDEX: 25.05 KG/M2

## 2025-05-27 DIAGNOSIS — J20.9 ACUTE BRONCHITIS WITH SYMPTOMS > 10 DAYS: ICD-10-CM

## 2025-05-27 DIAGNOSIS — J01.00 ACUTE NON-RECURRENT MAXILLARY SINUSITIS: Primary | ICD-10-CM

## 2025-05-27 DIAGNOSIS — R05.1 ACUTE COUGH: ICD-10-CM

## 2025-05-27 PROCEDURE — 3075F SYST BP GE 130 - 139MM HG: CPT | Performed by: NURSE PRACTITIONER

## 2025-05-27 PROCEDURE — 3080F DIAST BP >= 90 MM HG: CPT | Performed by: NURSE PRACTITIONER

## 2025-05-27 PROCEDURE — 99214 OFFICE O/P EST MOD 30 MIN: CPT | Performed by: NURSE PRACTITIONER

## 2025-05-27 PROCEDURE — 71046 X-RAY EXAM CHEST 2 VIEWS: CPT | Mod: TC | Performed by: RADIOLOGY

## 2025-05-27 RX ORDER — ALBUTEROL SULFATE 90 UG/1
2 INHALANT RESPIRATORY (INHALATION) EVERY 6 HOURS PRN
Qty: 18 G | Refills: 0 | Status: SHIPPED | OUTPATIENT
Start: 2025-05-27 | End: 2025-06-26

## 2025-05-27 RX ORDER — DOXYCYCLINE 100 MG/1
100 CAPSULE ORAL 2 TIMES DAILY
Qty: 14 CAPSULE | Refills: 0 | Status: SHIPPED | OUTPATIENT
Start: 2025-05-27 | End: 2025-06-03

## 2025-05-27 RX ORDER — PREDNISONE 50 MG/1
50 TABLET ORAL DAILY
Qty: 5 TABLET | Refills: 0 | Status: SHIPPED | OUTPATIENT
Start: 2025-05-27 | End: 2025-06-01

## 2025-05-27 ASSESSMENT — ENCOUNTER SYMPTOMS
RHINORRHEA: 1
EYES NEGATIVE: 1
CHILLS: 1
WHEEZING: 1
FEVER: 0
CARDIOVASCULAR NEGATIVE: 1
SORE THROAT: 0
SINUS PAIN: 1
COUGH: 1
SHORTNESS OF BREATH: 0
FATIGUE: 1

## 2025-05-27 NOTE — PROGRESS NOTES
Assessment & Plan       ICD-10-CM    1. Acute non-recurrent maxillary sinusitis  J01.00 doxycycline hyclate (VIBRAMYCIN) 100 MG capsule      2. Acute cough  R05.1 XR Chest 2 Views      3. Acute bronchitis with symptoms > 10 days  J20.9 albuterol (PROAIR HFA/PROVENTIL HFA/VENTOLIN HFA) 108 (90 Base) MCG/ACT inhaler     predniSONE (DELTASONE) 50 MG tablet          Patient Instructions   Doxycycline 100 mg twice daily for 10 days for sinusitis/bronchitis.    Prednisone 50 mg once daily for 5 days for bronchitis.    Albuterol inhaler 2 puffs every 6 hours while awake consistently over the next 5 days then as needed for bronchitis.    Increase fluids with water, Pedialyte, Gatorade, or rehydrating beverages.     Alternate Tylenol and Ibuprofen as needed for aches, pains or fever.     If needed, follow soft food diet. Rest as much as possible.  Run humidifier at night. Have warm tea or water with honey.     Follow up if symptoms persist or worsen. If you have any red flag symptoms go to the Emergency Department.    This usually can last 7-10 days.       Patient agreed to the treatment plan and verbalized understanding.     Results for orders placed or performed in visit on 05/27/25   XR Chest 2 Views     Status: None    Narrative    EXAM: XR CHEST 2 VIEWS  LOCATION: [facility]  DATE: 5/27/2025    INDICATION: Acute cough.  COMPARISON: Chest radiograph 11/16/2024.      Impression    IMPRESSION:     Lungs are clear. No evidence of pneumonia. No pleural effusions or pneumothorax. Normal pulmonary vascularity. Nonenlarged cardiac silhouette.    Cervical hardware.      I independently visualized the xray: No consolidation or infiltrate noted on the chest x-ray.  No other acute process noted.  Please see radiologist read above.      Torie Nelson is a 30 year old female who presents to clinic today for the following health issues:  Chief Complaint   Patient presents with    Urgent Care     Ongoing  cough productive with yellow/green sputum and nasal discharge , wheezing, tightness in chest -albuterol usually helps with this cough - worse at night      HPI  Patient states she has had sinus congestion, chest congestion, and a productive cough for a little over 2 weeks.  She states her albuterol inhaler was helping with her cough but she has not run out of it.  She denies any known fevers.      Review of Systems   Constitutional:  Positive for chills and fatigue. Negative for fever.   HENT:  Positive for congestion, postnasal drip, rhinorrhea and sinus pain. Negative for ear pain and sore throat.    Eyes: Negative.    Respiratory:  Positive for cough and wheezing. Negative for shortness of breath.    Cardiovascular: Negative.        Problem List:  2023-03: Indication for care in labor or delivery  2023-03: Normal delivery  2022-02: Hashimoto's thyroiditis  2022-02: Thyroid nodule      Past Medical History:   Diagnosis Date    Hashimoto's thyroiditis     Thyroid nodule     Uncomplicated asthma     Childhood Asthma         Social History     Tobacco Use    Smoking status: Never    Smokeless tobacco: Never   Substance Use Topics    Alcohol use: No           Objective    BP (!) 134/91   Pulse 97   Temp 98.3  F (36.8  C) (Tympanic)   Resp 18   Wt 69.9 kg (154 lb)   SpO2 96%   BMI 25.05 kg/m    Physical Exam  Vitals and nursing note reviewed.   Constitutional:       Appearance: Normal appearance.   HENT:      Head: Normocephalic and atraumatic.      Right Ear: Tympanic membrane and ear canal normal.      Left Ear: Tympanic membrane and ear canal normal.      Nose: Congestion and rhinorrhea (Clear to yellow nasal drainage) present.      Mouth/Throat:      Mouth: Mucous membranes are moist.      Pharynx: Oropharynx is clear. No oropharyngeal exudate or posterior oropharyngeal erythema.   Eyes:      General:         Right eye: No discharge.         Left eye: No discharge.      Extraocular Movements: Extraocular  movements intact.      Conjunctiva/sclera: Conjunctivae normal.      Pupils: Pupils are equal, round, and reactive to light.   Cardiovascular:      Rate and Rhythm: Normal rate and regular rhythm.      Heart sounds: Normal heart sounds.   Pulmonary:      Effort: Pulmonary effort is normal.      Breath sounds: Normal breath sounds.   Musculoskeletal:      Cervical back: Normal range of motion and neck supple.   Lymphadenopathy:      Cervical: No cervical adenopathy.   Neurological:      Mental Status: She is alert.              Sirena Madsen NP

## 2025-05-27 NOTE — PROGRESS NOTES
Urgent Care Clinic Visit     Chief Complaint   Patient presents with    Urgent Care     Ongoing cough productive with yellow/green sputum and nasal discharge , wheezing, tightness in chest -albuterol usually helps with this cough - worse at night          5/27/2025     4:31 PM   Additional Questions   Roomed by Breana

## 2025-05-27 NOTE — PATIENT INSTRUCTIONS
Doxycycline 100 mg twice daily for 10 days for sinusitis/bronchitis.    Prednisone 50 mg once daily for 5 days for bronchitis.    Albuterol inhaler 2 puffs every 6 hours while awake consistently over the next 5 days then as needed for bronchitis.    Increase fluids with water, Pedialyte, Gatorade, or rehydrating beverages.     Alternate Tylenol and Ibuprofen as needed for aches, pains or fever.     If needed, follow soft food diet. Rest as much as possible.  Run humidifier at night. Have warm tea or water with honey.     Follow up if symptoms persist or worsen. If you have any red flag symptoms go to the Emergency Department.    This usually can last 7-10 days.

## 2025-06-05 ENCOUNTER — ANCILLARY PROCEDURE (OUTPATIENT)
Dept: ULTRASOUND IMAGING | Facility: CLINIC | Age: 30
End: 2025-06-05
Attending: INTERNAL MEDICINE
Payer: COMMERCIAL

## 2025-06-05 DIAGNOSIS — E04.1 THYROID NODULE: ICD-10-CM

## 2025-06-05 PROCEDURE — 76536 US EXAM OF HEAD AND NECK: CPT

## 2025-06-06 ENCOUNTER — RESULTS FOLLOW-UP (OUTPATIENT)
Dept: FAMILY MEDICINE | Facility: CLINIC | Age: 30
End: 2025-06-06

## 2025-06-16 ENCOUNTER — TRANSFERRED RECORDS (OUTPATIENT)
Dept: HEALTH INFORMATION MANAGEMENT | Facility: CLINIC | Age: 30
End: 2025-06-16
Payer: COMMERCIAL

## 2025-07-05 ENCOUNTER — TRANSFERRED RECORDS (OUTPATIENT)
Dept: HEALTH INFORMATION MANAGEMENT | Facility: CLINIC | Age: 30
End: 2025-07-05
Payer: COMMERCIAL

## 2025-07-14 ENCOUNTER — OFFICE VISIT (OUTPATIENT)
Dept: URGENT CARE | Facility: URGENT CARE | Age: 30
End: 2025-07-14
Payer: COMMERCIAL

## 2025-07-14 ENCOUNTER — ANCILLARY PROCEDURE (OUTPATIENT)
Dept: GENERAL RADIOLOGY | Facility: CLINIC | Age: 30
End: 2025-07-14
Attending: NURSE PRACTITIONER
Payer: COMMERCIAL

## 2025-07-14 VITALS
BODY MASS INDEX: 26.03 KG/M2 | WEIGHT: 162 LBS | OXYGEN SATURATION: 98 % | DIASTOLIC BLOOD PRESSURE: 85 MMHG | HEART RATE: 90 BPM | SYSTOLIC BLOOD PRESSURE: 127 MMHG | TEMPERATURE: 98.9 F | RESPIRATION RATE: 18 BRPM | HEIGHT: 66 IN

## 2025-07-14 DIAGNOSIS — R05.1 ACUTE COUGH: Primary | ICD-10-CM

## 2025-07-14 DIAGNOSIS — J15.9 COMMUNITY ACQUIRED BACTERIAL PNEUMONIA: ICD-10-CM

## 2025-07-14 DIAGNOSIS — R07.89 PRESSURE IN CHEST: ICD-10-CM

## 2025-07-14 LAB
ALBUMIN SERPL-MCNC: 4 G/DL (ref 3.4–5)
ALP SERPL-CCNC: 71 U/L (ref 40–150)
ALT SERPL W P-5'-P-CCNC: 21 U/L (ref 0–50)
ANION GAP SERPL CALCULATED.3IONS-SCNC: 10 MMOL/L (ref 3–14)
AST SERPL W P-5'-P-CCNC: 33 U/L (ref 0–45)
BASOPHILS # BLD AUTO: 0 10E3/UL (ref 0–0.2)
BASOPHILS NFR BLD AUTO: 0 %
BILIRUB SERPL-MCNC: 0.5 MG/DL (ref 0.2–1.3)
BUN SERPL-MCNC: 11 MG/DL (ref 7–30)
CALCIUM SERPL-MCNC: 9.5 MG/DL (ref 8.5–10.1)
CHLORIDE BLD-SCNC: 104 MMOL/L (ref 94–109)
CO2 SERPL-SCNC: 26 MMOL/L (ref 20–32)
CREAT SERPL-MCNC: 0.7 MG/DL (ref 0.52–1.04)
EGFRCR SERPLBLD CKD-EPI 2021: >90 ML/MIN/1.73M2
EOSINOPHIL # BLD AUTO: 0.1 10E3/UL (ref 0–0.7)
EOSINOPHIL NFR BLD AUTO: 3 %
ERYTHROCYTE [DISTWIDTH] IN BLOOD BY AUTOMATED COUNT: 13 % (ref 10–15)
GLUCOSE BLD-MCNC: 93 MG/DL (ref 70–99)
HCT VFR BLD AUTO: 42.3 % (ref 35–47)
HGB BLD-MCNC: 13.5 G/DL (ref 11.7–15.7)
IMM GRANULOCYTES # BLD: 0 10E3/UL
IMM GRANULOCYTES NFR BLD: 0 %
LYMPHOCYTES # BLD AUTO: 2.1 10E3/UL (ref 0.8–5.3)
LYMPHOCYTES NFR BLD AUTO: 44 %
MCH RBC QN AUTO: 29 PG (ref 26.5–33)
MCHC RBC AUTO-ENTMCNC: 31.9 G/DL (ref 31.5–36.5)
MCV RBC AUTO: 91 FL (ref 78–100)
MONOCYTES # BLD AUTO: 0.3 10E3/UL (ref 0–1.3)
MONOCYTES NFR BLD AUTO: 7 %
NEUTROPHILS # BLD AUTO: 2.3 10E3/UL (ref 1.6–8.3)
NEUTROPHILS NFR BLD AUTO: 46 %
PLATELET # BLD AUTO: 246 10E3/UL (ref 150–450)
POTASSIUM BLD-SCNC: 3.9 MMOL/L (ref 3.4–5.3)
PROT SERPL-MCNC: 8.7 G/DL (ref 6.8–8.8)
RBC # BLD AUTO: 4.65 10E6/UL (ref 3.8–5.2)
SODIUM SERPL-SCNC: 140 MMOL/L (ref 135–145)
WBC # BLD AUTO: 4.9 10E3/UL (ref 4–11)

## 2025-07-14 PROCEDURE — 71046 X-RAY EXAM CHEST 2 VIEWS: CPT | Mod: TC | Performed by: RADIOLOGY

## 2025-07-14 PROCEDURE — 85025 COMPLETE CBC W/AUTO DIFF WBC: CPT | Performed by: NURSE PRACTITIONER

## 2025-07-14 PROCEDURE — 94640 AIRWAY INHALATION TREATMENT: CPT | Performed by: NURSE PRACTITIONER

## 2025-07-14 PROCEDURE — 99214 OFFICE O/P EST MOD 30 MIN: CPT | Mod: 25 | Performed by: NURSE PRACTITIONER

## 2025-07-14 PROCEDURE — 36415 COLL VENOUS BLD VENIPUNCTURE: CPT | Performed by: NURSE PRACTITIONER

## 2025-07-14 PROCEDURE — 80053 COMPREHEN METABOLIC PANEL: CPT | Performed by: NURSE PRACTITIONER

## 2025-07-14 PROCEDURE — 3079F DIAST BP 80-89 MM HG: CPT | Performed by: NURSE PRACTITIONER

## 2025-07-14 PROCEDURE — 3074F SYST BP LT 130 MM HG: CPT | Performed by: NURSE PRACTITIONER

## 2025-07-14 RX ORDER — PREDNISONE 20 MG/1
20 TABLET ORAL DAILY
Qty: 5 TABLET | Refills: 0 | Status: SHIPPED | OUTPATIENT
Start: 2025-07-14 | End: 2025-07-19

## 2025-07-14 RX ORDER — IPRATROPIUM BROMIDE AND ALBUTEROL SULFATE 2.5; .5 MG/3ML; MG/3ML
1 SOLUTION RESPIRATORY (INHALATION) EVERY 6 HOURS PRN
Qty: 90 ML | Refills: 0 | Status: SHIPPED | OUTPATIENT
Start: 2025-07-14

## 2025-07-14 RX ORDER — IPRATROPIUM BROMIDE AND ALBUTEROL SULFATE 2.5; .5 MG/3ML; MG/3ML
3 SOLUTION RESPIRATORY (INHALATION) ONCE
Status: COMPLETED | OUTPATIENT
Start: 2025-07-14 | End: 2025-07-14

## 2025-07-14 RX ORDER — AZITHROMYCIN 250 MG/1
TABLET, FILM COATED ORAL
Qty: 6 TABLET | Refills: 0 | Status: SHIPPED | OUTPATIENT
Start: 2025-07-14 | End: 2025-07-19

## 2025-07-14 RX ADMIN — IPRATROPIUM BROMIDE AND ALBUTEROL SULFATE 3 ML: 2.5; .5 SOLUTION RESPIRATORY (INHALATION) at 16:05

## 2025-07-14 NOTE — PROGRESS NOTES
Urgent Care Clinic Visit    Chief Complaint   Patient presents with    Urgent Care    Sinus Problem     Sinus congestion, cough, chest feels heavy, can hear crackles in lung, fever last week. Patient was positive for strep on 7/5, has one more day of penicillin.                7/14/2025     3:30 PM   Additional Questions   Roomed by Griselda Gonzalez & Plan     Acute cough  - ipratropium - albuterol 0.5 mg/2.5 mg (3mg)/3 mL (DUONEB) neb solution 3 mL  - XR Chest 2 Views  - CBC with platelets and differential  - Comprehensive metabolic panel (BMP + Alb, Alk Phos, ALT, AST, Total. Bili, TP)  - CBC with platelets and differential  - Comprehensive metabolic panel (BMP + Alb, Alk Phos, ALT, AST, Total. Bili, TP)  - azithromycin (ZITHROMAX) 250 MG tablet  Dispense: 6 tablet; Refill: 0  - amoxicillin-clavulanate (AUGMENTIN) 875-125 MG tablet  Dispense: 14 tablet; Refill: 0    Pressure in chest  - ipratropium - albuterol 0.5 mg/2.5 mg (3mg)/3 mL (DUONEB) neb solution 3 mL  - XR Chest 2 Views  - CBC with platelets and differential  - Comprehensive metabolic panel (BMP + Alb, Alk Phos, ALT, AST, Total. Bili, TP)  - CBC with platelets and differential  - Comprehensive metabolic panel (BMP + Alb, Alk Phos, ALT, AST, Total. Bili, TP)  - Nebulizer and Supplies Order for DME - ONLY FOR DME  - ipratropium - albuterol 0.5 mg/2.5 mg, 3mg,/3 mL (DUONEB) 0.5-2.5 (3) MG/3ML neb solution  Dispense: 90 mL; Refill: 0  - predniSONE (DELTASONE) 20 MG tablet  Dispense: 5 tablet; Refill: 0    Community acquired bacterial pneumonia  - azithromycin (ZITHROMAX) 250 MG tablet  Dispense: 6 tablet; Refill: 0  - amoxicillin-clavulanate (AUGMENTIN) 875-125 MG tablet  Dispense: 14 tablet; Refill: 0  - Nebulizer and Supplies Order for DME - ONLY FOR DME  - ipratropium - albuterol 0.5 mg/2.5 mg, 3mg,/3 mL (DUONEB) 0.5-2.5 (3) MG/3ML neb solution  Dispense: 90 mL; Refill: 0  - predniSONE (DELTASONE) 20 MG tablet  Dispense: 5 tablet; Refill: 0        Patient Instructions   Xray interpreted as positive in the clinic for patchy consolidation on left lower lobe per this NP.  Pending radiologist review.      CBC unremarkable but she has been on amoxicillin for the last week.    Advised to get CXR rechecked in a month  Start Augmentin twice a day for 7 days and zpak daily for 5 days.   Duoneb given in clinic with some relief.   DME neb and duoneb sent home as needed  Start prednisone daily x 5 days.         Results for orders placed or performed in visit on 07/14/25   XR Chest 2 Views     Status: None    Narrative    EXAM: XR CHEST 2 VIEWS  LOCATION: [facility] [place]  DATE: 7/14/2025    INDICATION: Ac cough. Chest pressure.  COMPARISON: Chest x-ray 5/27/2025.      Impression    IMPRESSION:     New patchy consolidation at the left lower lung. This may represent airspace disease such as pneumonia. Recommend follow-up chest x-ray or CT in one month to ensure resolution. Normal cardiac silhouette. No effusion.   Comprehensive metabolic panel (BMP + Alb, Alk Phos, ALT, AST, Total. Bili, TP)     Status: Normal   Result Value Ref Range    Sodium 140 135 - 145 mmol/L    Potassium 3.9 3.4 - 5.3 mmol/L    Chloride 104 94 - 109 mmol/L    Carbon Dioxide (CO2) 26 20 - 32 mmol/L    Anion Gap 10 3 - 14 mmol/L    Urea Nitrogen 11 7 - 30 mg/dL    Creatinine 0.70 0.52 - 1.04 mg/dL    GFR Estimate >90 >60 mL/min/1.73m2    Calcium 9.5 8.5 - 10.1 mg/dL    Glucose 93 70 - 99 mg/dL    Alkaline Phosphatase 71 40 - 150 U/L    AST 33 0 - 45 U/L    ALT 21 0 - 50 U/L    Protein Total 8.7 6.8 - 8.8 g/dL    Albumin 4.0 3.4 - 5.0 g/dL    Bilirubin Total 0.5 0.2 - 1.3 mg/dL   CBC with platelets and differential     Status: None   Result Value Ref Range    WBC Count 4.9 4.0 - 11.0 10e3/uL    RBC Count 4.65 3.80 - 5.20 10e6/uL    Hemoglobin 13.5 11.7 - 15.7 g/dL    Hematocrit 42.3 35.0 - 47.0 %    MCV 91 78 - 100 fL    MCH 29.0 26.5 - 33.0 pg    MCHC 31.9 31.5 - 36.5 g/dL     RDW 13.0 10.0 - 15.0 %    Platelet Count 246 150 - 450 10e3/uL    % Neutrophils 46 %    % Lymphocytes 44 %    % Monocytes 7 %    % Eosinophils 3 %    % Basophils 0 %    % Immature Granulocytes 0 %    Absolute Neutrophils 2.3 1.6 - 8.3 10e3/uL    Absolute Lymphocytes 2.1 0.8 - 5.3 10e3/uL    Absolute Monocytes 0.3 0.0 - 1.3 10e3/uL    Absolute Eosinophils 0.1 0.0 - 0.7 10e3/uL    Absolute Basophils 0.0 0.0 - 0.2 10e3/uL    Absolute Immature Granulocytes 0.0 <=0.4 10e3/uL   CBC with platelets and differential     Status: None    Narrative    The following orders were created for panel order CBC with platelets and differential.  Procedure                               Abnormality         Status                     ---------                               -----------         ------                     CBC with platelets and ...[[id number]]                      Final result                 Please view results for these tests on the individual orders.               No follow-ups on file.    TIMOTHY Orozco [facility] [place] Nelson is a 30 year old female who presents to clinic today for the following health issues:  Chief Complaint   Patient presents with    Urgent Care    Sinus Problem     Sinus congestion, cough, chest feels heavy, can hear crackles in lung, fever last week. Patient was positive for strep on 7/5, has one more day of penicillin.          7/14/2025     3:30 PM   Additional Questions   Roomed by Griselda ALBERT Adult    Onset of symptoms was 2 week(s) ago.  Course of illness is worsening.    Severity moderately severe  Current and Associated symptoms: fever, runny nose, cough - productive, wheezing, shortness of breath, sore throat, hoarse voice, and fatigue  Treatment measures tried include Tylenol/Ibuprofen, Fluids, and amoxicillin .  Predisposing factors include exposure to strep and HX of asthma.      Review of Systems  Constitutional, HEENT,  "cardiovascular, pulmonary, GI, , musculoskeletal, neuro, skin, endocrine and psych systems are negative, except as otherwise noted.      Objective    /85   Pulse 90   Temp 98.9  F (37.2  C) (Tympanic)   Resp 18   Ht 1.676 m (5' 6\")   Wt 73.5 kg (162 lb)   SpO2 98%   BMI 26.15 kg/m    Physical Exam   GENERAL: alert and no distress  EYES: Eyes grossly normal to inspection, PERRL and conjunctivae and sclerae normal  HENT: ear canals and TM's normal, nose and mouth without ulcers or lesions  NECK: no adenopathy, no asymmetry, masses, or scars  RESP: rhonchi L lower posterior and decreased breath sounds throughout  CV: regular rate and rhythm, normal S1 S2, no S3 or S4, no murmur, click or rub, no peripheral edema  ABDOMEN: soft, nontender, no hepatosplenomegaly, no masses and bowel sounds normal  MS: no gross musculoskeletal defects noted, no edema          "

## 2025-07-14 NOTE — PATIENT INSTRUCTIONS
Xray interpreted as positive in the clinic for patchy consolidation on left lower lobe per this NP.  Pending radiologist review.      CBC unremarkable but she has been on amoxicillin for the last week.    Advised to get CXR rechecked in a month  Start Augmentin twice a day for 7 days and zpak daily for 5 days.   Duoneb given in clinic with some relief.   DME neb and duoneb sent home as needed  Start prednisone daily x 5 days.         Results for orders placed or performed in visit on 07/14/25   XR Chest 2 Views     Status: None    Narrative    EXAM: XR CHEST 2 VIEWS  LOCATION: [facility] [place]  DATE: 7/14/2025    INDICATION: Ac cough. Chest pressure.  COMPARISON: Chest x-ray 5/27/2025.      Impression    IMPRESSION:     New patchy consolidation at the left lower lung. This may represent airspace disease such as pneumonia. Recommend follow-up chest x-ray or CT in one month to ensure resolution. Normal cardiac silhouette. No effusion.   Comprehensive metabolic panel (BMP + Alb, Alk Phos, ALT, AST, Total. Bili, TP)     Status: Normal   Result Value Ref Range    Sodium 140 135 - 145 mmol/L    Potassium 3.9 3.4 - 5.3 mmol/L    Chloride 104 94 - 109 mmol/L    Carbon Dioxide (CO2) 26 20 - 32 mmol/L    Anion Gap 10 3 - 14 mmol/L    Urea Nitrogen 11 7 - 30 mg/dL    Creatinine 0.70 0.52 - 1.04 mg/dL    GFR Estimate >90 >60 mL/min/1.73m2    Calcium 9.5 8.5 - 10.1 mg/dL    Glucose 93 70 - 99 mg/dL    Alkaline Phosphatase 71 40 - 150 U/L    AST 33 0 - 45 U/L    ALT 21 0 - 50 U/L    Protein Total 8.7 6.8 - 8.8 g/dL    Albumin 4.0 3.4 - 5.0 g/dL    Bilirubin Total 0.5 0.2 - 1.3 mg/dL   CBC with platelets and differential     Status: None   Result Value Ref Range    WBC Count 4.9 4.0 - 11.0 10e3/uL    RBC Count 4.65 3.80 - 5.20 10e6/uL    Hemoglobin 13.5 11.7 - 15.7 g/dL    Hematocrit 42.3 35.0 - 47.0 %    MCV 91 78 - 100 fL    MCH 29.0 26.5 - 33.0 pg    MCHC 31.9 31.5 - 36.5 g/dL    RDW 13.0 10.0 - 15.0 %     Platelet Count 246 150 - 450 10e3/uL    % Neutrophils 46 %    % Lymphocytes 44 %    % Monocytes 7 %    % Eosinophils 3 %    % Basophils 0 %    % Immature Granulocytes 0 %    Absolute Neutrophils 2.3 1.6 - 8.3 10e3/uL    Absolute Lymphocytes 2.1 0.8 - 5.3 10e3/uL    Absolute Monocytes 0.3 0.0 - 1.3 10e3/uL    Absolute Eosinophils 0.1 0.0 - 0.7 10e3/uL    Absolute Basophils 0.0 0.0 - 0.2 10e3/uL    Absolute Immature Granulocytes 0.0 <=0.4 10e3/uL   CBC with platelets and differential     Status: None    Narrative    The following orders were created for panel order CBC with platelets and differential.  Procedure                               Abnormality         Status                     ---------                               -----------         ------                     CBC with platelets and ...[[id number]]                      Final result                 Please view results for these tests on the individual orders.

## 2025-07-14 NOTE — LETTER
July 14, 2025      Omar Chatterjee  [address]    [place] [address]        To Whom It May Concern:    Omar Chatterjee  was seen on July 14, 2025 .  Please excuse her  until 7/16/25  due to illness.        Sincerely,        TIMOTHY Orozco CNP    Electronically signed

## 2025-08-05 ENCOUNTER — LAB (OUTPATIENT)
Dept: LAB | Facility: CLINIC | Age: 30
End: 2025-08-05
Payer: COMMERCIAL

## 2025-08-05 DIAGNOSIS — E61.1 IRON DEFICIENCY: ICD-10-CM

## 2025-08-05 DIAGNOSIS — R79.89 LOW VITAMIN D LEVEL: ICD-10-CM

## 2025-08-05 PROCEDURE — 36415 COLL VENOUS BLD VENIPUNCTURE: CPT

## 2025-08-05 PROCEDURE — 82728 ASSAY OF FERRITIN: CPT

## 2025-08-05 PROCEDURE — 82306 VITAMIN D 25 HYDROXY: CPT

## 2025-08-06 LAB
FERRITIN SERPL-MCNC: 15 NG/ML (ref 6–175)
VIT D+METAB SERPL-MCNC: 30 NG/ML (ref 20–50)

## 2025-08-26 ENCOUNTER — ANCILLARY PROCEDURE (OUTPATIENT)
Dept: GENERAL RADIOLOGY | Facility: CLINIC | Age: 30
End: 2025-08-26
Attending: PHYSICIAN ASSISTANT
Payer: COMMERCIAL

## 2025-08-26 DIAGNOSIS — J18.9 COMMUNITY ACQUIRED PNEUMONIA, UNSPECIFIED LATERALITY: ICD-10-CM

## 2025-08-26 PROCEDURE — 71046 X-RAY EXAM CHEST 2 VIEWS: CPT | Mod: TC | Performed by: RADIOLOGY
